# Patient Record
Sex: FEMALE | Race: WHITE | NOT HISPANIC OR LATINO
[De-identification: names, ages, dates, MRNs, and addresses within clinical notes are randomized per-mention and may not be internally consistent; named-entity substitution may affect disease eponyms.]

---

## 2021-10-15 ENCOUNTER — NON-APPOINTMENT (OUTPATIENT)
Age: 76
End: 2021-10-15

## 2021-10-15 ENCOUNTER — APPOINTMENT (OUTPATIENT)
Dept: HEART AND VASCULAR | Facility: CLINIC | Age: 76
End: 2021-10-15
Payer: MEDICARE

## 2021-10-15 VITALS
SYSTOLIC BLOOD PRESSURE: 130 MMHG | HEIGHT: 65 IN | WEIGHT: 181 LBS | HEART RATE: 51 BPM | BODY MASS INDEX: 30.16 KG/M2 | TEMPERATURE: 98.2 F | DIASTOLIC BLOOD PRESSURE: 78 MMHG

## 2021-10-15 DIAGNOSIS — R42 DIZZINESS AND GIDDINESS: ICD-10-CM

## 2021-10-15 DIAGNOSIS — Z23 ENCOUNTER FOR IMMUNIZATION: ICD-10-CM

## 2021-10-15 DIAGNOSIS — R00.2 PALPITATIONS: ICD-10-CM

## 2021-10-15 PROCEDURE — 93000 ELECTROCARDIOGRAM COMPLETE: CPT

## 2021-10-15 PROCEDURE — 93306 TTE W/DOPPLER COMPLETE: CPT

## 2021-10-15 PROCEDURE — 36415 COLL VENOUS BLD VENIPUNCTURE: CPT

## 2021-10-15 PROCEDURE — ZZZZZ: CPT

## 2021-10-15 PROCEDURE — G0008: CPT

## 2021-10-15 PROCEDURE — 99215 OFFICE O/P EST HI 40 MIN: CPT | Mod: 25

## 2021-10-15 PROCEDURE — 90662 IIV NO PRSV INCREASED AG IM: CPT

## 2021-10-16 NOTE — PHYSICAL EXAM
[Well Developed] : well developed [Well Nourished] : well nourished [No Acute Distress] : no acute distress [Normal Venous Pressure] : normal venous pressure [No Carotid Bruit] : no carotid bruit [Normal S1, S2] : normal S1, S2 [No Murmur] : no murmur [No Rub] : no rub [No Gallop] : no gallop [Clear Lung Fields] : clear lung fields [Good Air Entry] : good air entry [No Respiratory Distress] : no respiratory distress  [Soft] : abdomen soft [Non Tender] : non-tender [Normal Bowel Sounds] : normal bowel sounds [No Edema] : no edema [No Cyanosis] : no cyanosis [No Clubbing] : no clubbing [Normal Radial B/L] : normal radial B/L [Normal PT B/L] : normal PT B/L [Normal DP B/L] : normal DP B/L [Moves all extremities] : moves all extremities [No Focal Deficits] : no focal deficits [Normal memory] : normal memory [Alert and Oriented] : alert and oriented

## 2021-10-17 PROBLEM — R42 DIZZINESS: Status: ACTIVE | Noted: 2021-10-17

## 2021-10-17 PROBLEM — R00.2 PALPITATIONS: Status: ACTIVE | Noted: 2021-10-17

## 2021-10-17 LAB
ALBUMIN SERPL ELPH-MCNC: 4.6 G/DL
ALP BLD-CCNC: 116 U/L
ALT SERPL-CCNC: 11 U/L
ANION GAP SERPL CALC-SCNC: 15 MMOL/L
AST SERPL-CCNC: 16 U/L
BASOPHILS # BLD AUTO: 0.1 K/UL
BASOPHILS NFR BLD AUTO: 1.2 %
BILIRUB DIRECT SERPL-MCNC: 0.2 MG/DL
BILIRUB INDIRECT SERPL-MCNC: 0.4 MG/DL
BILIRUB SERPL-MCNC: 0.6 MG/DL
BUN SERPL-MCNC: 19 MG/DL
CALCIUM SERPL-MCNC: 9.5 MG/DL
CHLORIDE SERPL-SCNC: 102 MMOL/L
CHOLEST SERPL-MCNC: 182 MG/DL
CO2 SERPL-SCNC: 23 MMOL/L
CREAT SERPL-MCNC: 0.78 MG/DL
EOSINOPHIL # BLD AUTO: 0.17 K/UL
EOSINOPHIL NFR BLD AUTO: 2 %
ESTIMATED AVERAGE GLUCOSE: 114 MG/DL
GLUCOSE SERPL-MCNC: 92 MG/DL
HBA1C MFR BLD HPLC: 5.6 %
HCT VFR BLD CALC: 47.4 %
HDLC SERPL-MCNC: 74 MG/DL
HGB BLD-MCNC: 14.7 G/DL
IMM GRANULOCYTES NFR BLD AUTO: 0.3 %
LDLC SERPL CALC-MCNC: 80 MG/DL
LYMPHOCYTES # BLD AUTO: 2.73 K/UL
LYMPHOCYTES NFR BLD AUTO: 31.7 %
MAN DIFF?: NORMAL
MCHC RBC-ENTMCNC: 29.2 PG
MCHC RBC-ENTMCNC: 31 GM/DL
MCV RBC AUTO: 94 FL
MONOCYTES # BLD AUTO: 0.71 K/UL
MONOCYTES NFR BLD AUTO: 8.2 %
NEUTROPHILS # BLD AUTO: 4.88 K/UL
NEUTROPHILS NFR BLD AUTO: 56.6 %
NONHDLC SERPL-MCNC: 109 MG/DL
PLATELET # BLD AUTO: 322 K/UL
POTASSIUM SERPL-SCNC: 4.6 MMOL/L
PROT SERPL-MCNC: 7.4 G/DL
RBC # BLD: 5.04 M/UL
RBC # FLD: 13.6 %
SODIUM SERPL-SCNC: 140 MMOL/L
TRIGL SERPL-MCNC: 141 MG/DL
TSH SERPL-ACNC: 0.99 UIU/ML
WBC # FLD AUTO: 8.62 K/UL

## 2021-10-18 DIAGNOSIS — Z87.891 PERSONAL HISTORY OF NICOTINE DEPENDENCE: ICD-10-CM

## 2021-10-18 DIAGNOSIS — Z82.49 FAMILY HISTORY OF ISCHEMIC HEART DISEASE AND OTHER DISEASES OF THE CIRCULATORY SYSTEM: ICD-10-CM

## 2021-10-18 NOTE — DISCUSSION/SUMMARY
[FreeTextEntry1] : 76F with HTN, HLD, anxiety presenting to establish care. her BP is not well controlled on bystolic and since increasing her dose to 20mg she has experienced occasional dizziness. ECG today is sinus bradycardia, otherwise normal. cardiac exam unremarkable. she takes crestor for HLD and wonders if her cholesterol is at goal. her functional status is good, able to walk 5 blocks before experiencing leg pain. \par \par HTN: \par - wean off bystolic - 10mg for a week, 5mg the following week, then discontinue\par - start losartan 50mg daily and continue maintaining BP log\par - will obtain baseline echo\par  I have discussed the case with Dr. Miller, I have personally performed a history, physical exam, and my own medical decision making. I have reviewed the note and agree with the findings.  The patient sometimes has dyspnea when making her bed while on the phone.  She has left upper quadrant pain which last for seconds to a minute and has been occurring for years.  Last episode was a month ago.  She has palpitations twice a week which last for minutes.  She had dizziness for the last 2 days on the increased dose of Bystolic.  The EKG done for hypertension shows T wave inversions in V1 and V2.  The echocardiogram shows pulmonary hypertension.  The patient will undergo CT pulmonary angiogram and PFTs.  She will have additional echo views for an atrial septal defect.\par HLD:\par - c/w crestor 10mg daily\par - sending lipid panel,BMP\par \par

## 2021-10-18 NOTE — REASON FOR VISIT
[Symptom and Test Evaluation] : symptom and test evaluation [FreeTextEntry1] : Ms. Kiser is a 76 F with PMH HTN on bystolic, HLD on crestor 10mg, anxiety  with a PSH of hemicolectomy d/t tumors who presents to establish care. she was previously on amlodipine for HTN but stopped d/t LE edema. since then she has been on increasing doses of bystolic, now taking 20mg. she states that since increasing her dose she gets dizzy occasionally, never syncope. she measures her BP at home and brought a log of them to the visit. BP mostly in 150s-160s/80s-90s with some readings int he 130s. Her  passed away 9 months ago and she has been having a hard time with that, stating she hasn’t been as active and has gained weight. she denies exertional dyspnea or chest pain, exercise mostly limited by leg pain for which she takes tramadol prn. she sleeps on one pillow, no PND or Le swelling.

## 2021-10-29 ENCOUNTER — APPOINTMENT (OUTPATIENT)
Dept: HEART AND VASCULAR | Facility: CLINIC | Age: 76
End: 2021-10-29
Payer: MEDICARE

## 2021-10-29 ENCOUNTER — INPATIENT (INPATIENT)
Facility: HOSPITAL | Age: 76
LOS: 2 days | Discharge: HOME | DRG: 175 | End: 2021-11-01
Attending: STUDENT IN AN ORGANIZED HEALTH CARE EDUCATION/TRAINING PROGRAM | Admitting: STUDENT IN AN ORGANIZED HEALTH CARE EDUCATION/TRAINING PROGRAM
Payer: MEDICARE

## 2021-10-29 VITALS — OXYGEN SATURATION: 90 %

## 2021-10-29 VITALS
DIASTOLIC BLOOD PRESSURE: 87 MMHG | TEMPERATURE: 98 F | OXYGEN SATURATION: 100 % | SYSTOLIC BLOOD PRESSURE: 164 MMHG | RESPIRATION RATE: 16 BRPM | WEIGHT: 177.91 LBS | HEART RATE: 76 BPM

## 2021-10-29 VITALS
HEART RATE: 64 BPM | BODY MASS INDEX: 33.61 KG/M2 | SYSTOLIC BLOOD PRESSURE: 160 MMHG | WEIGHT: 178 LBS | HEIGHT: 61.02 IN | DIASTOLIC BLOOD PRESSURE: 78 MMHG | TEMPERATURE: 98.6 F

## 2021-10-29 VITALS — OXYGEN SATURATION: 93 %

## 2021-10-29 DIAGNOSIS — Z01.89 ENCOUNTER FOR OTHER SPECIFIED SPECIAL EXAMINATIONS: ICD-10-CM

## 2021-10-29 LAB
ALBUMIN SERPL ELPH-MCNC: 4.3 G/DL — SIGNIFICANT CHANGE UP (ref 3.3–5)
ALP SERPL-CCNC: 104 U/L — SIGNIFICANT CHANGE UP (ref 40–120)
ALT FLD-CCNC: 10 U/L — SIGNIFICANT CHANGE UP (ref 10–45)
ANION GAP SERPL CALC-SCNC: 11 MMOL/L — SIGNIFICANT CHANGE UP (ref 5–17)
APTT BLD: 29.5 SEC — SIGNIFICANT CHANGE UP (ref 27.5–35.5)
AST SERPL-CCNC: 15 U/L — SIGNIFICANT CHANGE UP (ref 10–40)
BASOPHILS # BLD AUTO: 0.06 K/UL — SIGNIFICANT CHANGE UP (ref 0–0.2)
BASOPHILS NFR BLD AUTO: 0.7 % — SIGNIFICANT CHANGE UP (ref 0–2)
BILIRUB SERPL-MCNC: 0.5 MG/DL — SIGNIFICANT CHANGE UP (ref 0.2–1.2)
BUN SERPL-MCNC: 14 MG/DL — SIGNIFICANT CHANGE UP (ref 7–23)
CALCIUM SERPL-MCNC: 9 MG/DL — SIGNIFICANT CHANGE UP (ref 8.4–10.5)
CHLORIDE SERPL-SCNC: 103 MMOL/L — SIGNIFICANT CHANGE UP (ref 96–108)
CO2 SERPL-SCNC: 24 MMOL/L — SIGNIFICANT CHANGE UP (ref 22–31)
CREAT SERPL-MCNC: 0.8 MG/DL — SIGNIFICANT CHANGE UP (ref 0.5–1.3)
EOSINOPHIL # BLD AUTO: 0.12 K/UL — SIGNIFICANT CHANGE UP (ref 0–0.5)
EOSINOPHIL NFR BLD AUTO: 1.4 % — SIGNIFICANT CHANGE UP (ref 0–6)
GLUCOSE SERPL-MCNC: 105 MG/DL — HIGH (ref 70–99)
HCT VFR BLD CALC: 44.1 % — SIGNIFICANT CHANGE UP (ref 34.5–45)
HGB BLD-MCNC: 14.6 G/DL — SIGNIFICANT CHANGE UP (ref 11.5–15.5)
IMM GRANULOCYTES NFR BLD AUTO: 0.3 % — SIGNIFICANT CHANGE UP (ref 0–1.5)
INR BLD: 1.11 — SIGNIFICANT CHANGE UP (ref 0.88–1.16)
LYMPHOCYTES # BLD AUTO: 2.22 K/UL — SIGNIFICANT CHANGE UP (ref 1–3.3)
LYMPHOCYTES # BLD AUTO: 25.2 % — SIGNIFICANT CHANGE UP (ref 13–44)
MCHC RBC-ENTMCNC: 29.2 PG — SIGNIFICANT CHANGE UP (ref 27–34)
MCHC RBC-ENTMCNC: 33.1 GM/DL — SIGNIFICANT CHANGE UP (ref 32–36)
MCV RBC AUTO: 88.2 FL — SIGNIFICANT CHANGE UP (ref 80–100)
MONOCYTES # BLD AUTO: 0.71 K/UL — SIGNIFICANT CHANGE UP (ref 0–0.9)
MONOCYTES NFR BLD AUTO: 8.1 % — SIGNIFICANT CHANGE UP (ref 2–14)
NEUTROPHILS # BLD AUTO: 5.66 K/UL — SIGNIFICANT CHANGE UP (ref 1.8–7.4)
NEUTROPHILS NFR BLD AUTO: 64.3 % — SIGNIFICANT CHANGE UP (ref 43–77)
NRBC # BLD: 0 /100 WBCS — SIGNIFICANT CHANGE UP (ref 0–0)
PLATELET # BLD AUTO: 325 K/UL — SIGNIFICANT CHANGE UP (ref 150–400)
POTASSIUM SERPL-MCNC: 3.8 MMOL/L — SIGNIFICANT CHANGE UP (ref 3.5–5.3)
POTASSIUM SERPL-SCNC: 3.8 MMOL/L — SIGNIFICANT CHANGE UP (ref 3.5–5.3)
PROT SERPL-MCNC: 7.3 G/DL — SIGNIFICANT CHANGE UP (ref 6–8.3)
PROTHROM AB SERPL-ACNC: 13.3 SEC — SIGNIFICANT CHANGE UP (ref 10.6–13.6)
RBC # BLD: 5 M/UL — SIGNIFICANT CHANGE UP (ref 3.8–5.2)
RBC # FLD: 13.2 % — SIGNIFICANT CHANGE UP (ref 10.3–14.5)
SARS-COV-2 RNA SPEC QL NAA+PROBE: NEGATIVE — SIGNIFICANT CHANGE UP
SODIUM SERPL-SCNC: 138 MMOL/L — SIGNIFICANT CHANGE UP (ref 135–145)
WBC # BLD: 8.8 K/UL — SIGNIFICANT CHANGE UP (ref 3.8–10.5)
WBC # FLD AUTO: 8.8 K/UL — SIGNIFICANT CHANGE UP (ref 3.8–10.5)

## 2021-10-29 PROCEDURE — 71275 CT ANGIOGRAPHY CHEST: CPT | Mod: 26,MC

## 2021-10-29 PROCEDURE — 94010 BREATHING CAPACITY TEST: CPT | Mod: 59

## 2021-10-29 PROCEDURE — 99215 OFFICE O/P EST HI 40 MIN: CPT | Mod: 25

## 2021-10-29 PROCEDURE — 99285 EMERGENCY DEPT VISIT HI MDM: CPT

## 2021-10-29 PROCEDURE — 94621 CARDIOPULM EXERCISE TESTING: CPT

## 2021-10-29 PROCEDURE — 94727 GAS DIL/WSHOT DETER LNG VOL: CPT

## 2021-10-29 PROCEDURE — ZZZZZ: CPT

## 2021-10-29 PROCEDURE — 93321 DOPPLER ECHO F-UP/LMTD STD: CPT

## 2021-10-29 PROCEDURE — 94729 DIFFUSING CAPACITY: CPT

## 2021-10-29 PROCEDURE — 99291 CRITICAL CARE FIRST HOUR: CPT

## 2021-10-29 PROCEDURE — 93308 TTE F-UP OR LMTD: CPT

## 2021-10-29 PROCEDURE — 93000 ELECTROCARDIOGRAM COMPLETE: CPT

## 2021-10-29 PROCEDURE — 93010 ELECTROCARDIOGRAM REPORT: CPT

## 2021-10-29 RX ORDER — HEPARIN SODIUM 5000 [USP'U]/ML
3000 INJECTION INTRAVENOUS; SUBCUTANEOUS EVERY 6 HOURS
Refills: 0 | Status: DISCONTINUED | OUTPATIENT
Start: 2021-10-29 | End: 2021-10-30

## 2021-10-29 RX ORDER — HEPARIN SODIUM 5000 [USP'U]/ML
INJECTION INTRAVENOUS; SUBCUTANEOUS
Qty: 25000 | Refills: 0 | Status: DISCONTINUED | OUTPATIENT
Start: 2021-10-29 | End: 2021-10-30

## 2021-10-29 RX ORDER — HEPARIN SODIUM 5000 [USP'U]/ML
6500 INJECTION INTRAVENOUS; SUBCUTANEOUS ONCE
Refills: 0 | Status: COMPLETED | OUTPATIENT
Start: 2021-10-29 | End: 2021-10-29

## 2021-10-29 RX ORDER — HEPARIN SODIUM 5000 [USP'U]/ML
6500 INJECTION INTRAVENOUS; SUBCUTANEOUS EVERY 6 HOURS
Refills: 0 | Status: DISCONTINUED | OUTPATIENT
Start: 2021-10-29 | End: 2021-10-30

## 2021-10-29 RX ADMIN — HEPARIN SODIUM 1500 UNIT(S)/HR: 5000 INJECTION INTRAVENOUS; SUBCUTANEOUS at 21:08

## 2021-10-29 RX ADMIN — HEPARIN SODIUM 6500 UNIT(S): 5000 INJECTION INTRAVENOUS; SUBCUTANEOUS at 21:10

## 2021-10-29 NOTE — ED ADULT NURSE NOTE - OBJECTIVE STATEMENT
Sent in by MD for r/o PE due to elevated pulmonary pressures.  C/o of SOB [id number] increasing after taking the flu shot. long hx of uncontrolled HTN. Pt unable to walk a block like before. pt denies any cough, fever or chest pain.

## 2021-10-29 NOTE — ED PROVIDER NOTE - CLINICAL SUMMARY MEDICAL DECISION MAKING FREE TEXT BOX
75 y/o F pt presents to ED with shortness of breath. Plan for labs, CTA PE study, and reassess. 77 y/o F pt presents to ED with shortness of breath. Plan for labs, CTA PE study, and reassess.  Pt with bilateral PE's and right heart strain. Heparin IV given.  Pulm contacted and case discussed. Will be in ED to evaluate pt.   ICU consult placed- pt admitted to tele.

## 2021-10-29 NOTE — PHYSICAL EXAM
[Well Developed] : well developed [Well Nourished] : well nourished [Normal Conjunctiva] : normal conjunctiva [No Carotid Bruit] : no carotid bruit [Normal S1, S2] : normal S1, S2 [No Murmur] : no murmur [Clear Lung Fields] : clear lung fields [Good Air Entry] : good air entry [No Respiratory Distress] : no respiratory distress  [No Edema] : no edema [Normal Radial B/L] : normal radial B/L [Normal PT B/L] : normal PT B/L [Normal DP B/L] : normal DP B/L [Moves all extremities] : moves all extremities [No Focal Deficits] : no focal deficits [Alert and Oriented] : alert and oriented

## 2021-10-29 NOTE — ED ADULT NURSE NOTE - NURSING NEURO ORIENTATION
Anemia, chronic disease Epistaxis Closed fracture of left side of maxilla, initial encounter Closed fracture of left side of maxilla, initial encounter Dementia without behavioral disturbance, unspecified dementia type Dementia without behavioral disturbance, unspecified dementia type Epistaxis Epistaxis Fall, initial encounter oriented to person, place and time

## 2021-10-29 NOTE — ED PROVIDER NOTE - OBJECTIVE STATEMENT
77 y/o F pt with PMHx of HTN presents to ED c/o persistent shortness of breath x 3 weeks. Pt was seen by PMD Dr. Baron earlier today with echo done in office, pt found to have elevated pulmonary pressure to 80, and was instructed to come to ED for PE study. In ED, with increased shortness of breath, dyspnea with ambulation, but pt denies numbness, tingling, fever, congestion, cold, cough, and chest pressure. Otherwise, pt is well appearing and speaking in full sentences.

## 2021-10-30 DIAGNOSIS — I10 ESSENTIAL (PRIMARY) HYPERTENSION: ICD-10-CM

## 2021-10-30 DIAGNOSIS — Z29.9 ENCOUNTER FOR PROPHYLACTIC MEASURES, UNSPECIFIED: ICD-10-CM

## 2021-10-30 DIAGNOSIS — I26.99 OTHER PULMONARY EMBOLISM WITHOUT ACUTE COR PULMONALE: ICD-10-CM

## 2021-10-30 DIAGNOSIS — Z74.09 OTHER REDUCED MOBILITY: ICD-10-CM

## 2021-10-30 DIAGNOSIS — R09.89 OTHER SPECIFIED SYMPTOMS AND SIGNS INVOLVING THE CIRCULATORY AND RESPIRATORY SYSTEMS: ICD-10-CM

## 2021-10-30 LAB
ALBUMIN SERPL ELPH-MCNC: 3.6 G/DL — SIGNIFICANT CHANGE UP (ref 3.3–5)
ALP SERPL-CCNC: 94 U/L — SIGNIFICANT CHANGE UP (ref 40–120)
ALT FLD-CCNC: 8 U/L — LOW (ref 10–45)
ANION GAP SERPL CALC-SCNC: 10 MMOL/L — SIGNIFICANT CHANGE UP (ref 5–17)
APTT BLD: 138.8 SEC — CRITICAL HIGH (ref 27.5–35.5)
APTT BLD: 161.3 SEC — CRITICAL HIGH (ref 27.5–35.5)
APTT BLD: 64 SEC — HIGH (ref 27.5–35.5)
APTT BLD: 65.3 SEC — HIGH (ref 27.5–35.5)
APTT BLD: >200 SEC — CRITICAL HIGH (ref 27.5–35.5)
AST SERPL-CCNC: 15 U/L — SIGNIFICANT CHANGE UP (ref 10–40)
BASOPHILS # BLD AUTO: 0.12 K/UL — SIGNIFICANT CHANGE UP (ref 0–0.2)
BASOPHILS NFR BLD AUTO: 1.6 % — SIGNIFICANT CHANGE UP (ref 0–2)
BILIRUB SERPL-MCNC: 0.7 MG/DL — SIGNIFICANT CHANGE UP (ref 0.2–1.2)
BUN SERPL-MCNC: 12 MG/DL — SIGNIFICANT CHANGE UP (ref 7–23)
CALCIUM SERPL-MCNC: 8.9 MG/DL — SIGNIFICANT CHANGE UP (ref 8.4–10.5)
CHLORIDE SERPL-SCNC: 105 MMOL/L — SIGNIFICANT CHANGE UP (ref 96–108)
CK MB CFR SERPL CALC: 2.2 NG/ML — SIGNIFICANT CHANGE UP (ref 0–6.7)
CK SERPL-CCNC: 63 U/L — SIGNIFICANT CHANGE UP (ref 25–170)
CO2 SERPL-SCNC: 24 MMOL/L — SIGNIFICANT CHANGE UP (ref 22–31)
CREAT SERPL-MCNC: 0.82 MG/DL — SIGNIFICANT CHANGE UP (ref 0.5–1.3)
D DIMER BLD IA.RAPID-MCNC: 1484 NG/ML DDU — HIGH
EOSINOPHIL # BLD AUTO: 0.38 K/UL — SIGNIFICANT CHANGE UP (ref 0–0.5)
EOSINOPHIL NFR BLD AUTO: 5 % — SIGNIFICANT CHANGE UP (ref 0–6)
GLUCOSE SERPL-MCNC: 99 MG/DL — SIGNIFICANT CHANGE UP (ref 70–99)
HCT VFR BLD CALC: 40.7 % — SIGNIFICANT CHANGE UP (ref 34.5–45)
HGB BLD-MCNC: 13.1 G/DL — SIGNIFICANT CHANGE UP (ref 11.5–15.5)
IMM GRANULOCYTES NFR BLD AUTO: 0.3 % — SIGNIFICANT CHANGE UP (ref 0–1.5)
LACTATE SERPL-SCNC: 0.8 MMOL/L — SIGNIFICANT CHANGE UP (ref 0.5–2)
LYMPHOCYTES # BLD AUTO: 2.49 K/UL — SIGNIFICANT CHANGE UP (ref 1–3.3)
LYMPHOCYTES # BLD AUTO: 33.1 % — SIGNIFICANT CHANGE UP (ref 13–44)
MAGNESIUM SERPL-MCNC: 1.9 MG/DL — SIGNIFICANT CHANGE UP (ref 1.6–2.6)
MCHC RBC-ENTMCNC: 28.4 PG — SIGNIFICANT CHANGE UP (ref 27–34)
MCHC RBC-ENTMCNC: 32.2 GM/DL — SIGNIFICANT CHANGE UP (ref 32–36)
MCV RBC AUTO: 88.3 FL — SIGNIFICANT CHANGE UP (ref 80–100)
MONOCYTES # BLD AUTO: 0.7 K/UL — SIGNIFICANT CHANGE UP (ref 0–0.9)
MONOCYTES NFR BLD AUTO: 9.3 % — SIGNIFICANT CHANGE UP (ref 2–14)
NEUTROPHILS # BLD AUTO: 3.82 K/UL — SIGNIFICANT CHANGE UP (ref 1.8–7.4)
NEUTROPHILS NFR BLD AUTO: 50.7 % — SIGNIFICANT CHANGE UP (ref 43–77)
NRBC # BLD: 0 /100 WBCS — SIGNIFICANT CHANGE UP (ref 0–0)
NT-PROBNP SERPL-SCNC: 396 PG/ML — HIGH (ref 0–300)
PHOSPHATE SERPL-MCNC: 4 MG/DL — SIGNIFICANT CHANGE UP (ref 2.5–4.5)
PLATELET # BLD AUTO: 300 K/UL — SIGNIFICANT CHANGE UP (ref 150–400)
POTASSIUM SERPL-MCNC: 4.2 MMOL/L — SIGNIFICANT CHANGE UP (ref 3.5–5.3)
POTASSIUM SERPL-SCNC: 4.2 MMOL/L — SIGNIFICANT CHANGE UP (ref 3.5–5.3)
PROT SERPL-MCNC: 6.5 G/DL — SIGNIFICANT CHANGE UP (ref 6–8.3)
RBC # BLD: 4.61 M/UL — SIGNIFICANT CHANGE UP (ref 3.8–5.2)
RBC # FLD: 13.1 % — SIGNIFICANT CHANGE UP (ref 10.3–14.5)
SODIUM SERPL-SCNC: 139 MMOL/L — SIGNIFICANT CHANGE UP (ref 135–145)
TROPONIN T SERPL-MCNC: 0.01 NG/ML — SIGNIFICANT CHANGE UP (ref 0–0.01)
WBC # BLD: 7.53 K/UL — SIGNIFICANT CHANGE UP (ref 3.8–10.5)
WBC # FLD AUTO: 7.53 K/UL — SIGNIFICANT CHANGE UP (ref 3.8–10.5)

## 2021-10-30 PROCEDURE — 93970 EXTREMITY STUDY: CPT | Mod: 26

## 2021-10-30 PROCEDURE — 99233 SBSQ HOSP IP/OBS HIGH 50: CPT | Mod: GC

## 2021-10-30 RX ORDER — TRAMADOL HYDROCHLORIDE 50 MG/1
25 TABLET ORAL
Qty: 0 | Refills: 0 | DISCHARGE

## 2021-10-30 RX ORDER — ATORVASTATIN CALCIUM 80 MG/1
40 TABLET, FILM COATED ORAL AT BEDTIME
Refills: 0 | Status: DISCONTINUED | OUTPATIENT
Start: 2021-10-30 | End: 2021-11-01

## 2021-10-30 RX ORDER — ROSUVASTATIN CALCIUM 5 MG/1
1 TABLET ORAL
Qty: 0 | Refills: 0 | DISCHARGE

## 2021-10-30 RX ORDER — TRAMADOL HYDROCHLORIDE 50 MG/1
25 TABLET ORAL EVERY 6 HOURS
Refills: 0 | Status: DISCONTINUED | OUTPATIENT
Start: 2021-10-30 | End: 2021-11-01

## 2021-10-30 RX ORDER — NEBIVOLOL HYDROCHLORIDE 5 MG/1
1 TABLET ORAL
Qty: 0 | Refills: 0 | DISCHARGE

## 2021-10-30 RX ORDER — HEPARIN SODIUM 5000 [USP'U]/ML
1200 INJECTION INTRAVENOUS; SUBCUTANEOUS
Qty: 25000 | Refills: 0 | Status: DISCONTINUED | OUTPATIENT
Start: 2021-10-30 | End: 2021-10-30

## 2021-10-30 RX ORDER — HEPARIN SODIUM 5000 [USP'U]/ML
900 INJECTION INTRAVENOUS; SUBCUTANEOUS
Qty: 25000 | Refills: 0 | Status: DISCONTINUED | OUTPATIENT
Start: 2021-10-30 | End: 2021-10-31

## 2021-10-30 RX ORDER — NEBIVOLOL HYDROCHLORIDE 5 MG/1
10 TABLET ORAL DAILY
Refills: 0 | Status: DISCONTINUED | OUTPATIENT
Start: 2021-10-30 | End: 2021-11-01

## 2021-10-30 RX ADMIN — HEPARIN SODIUM 9 UNIT(S)/HR: 5000 INJECTION INTRAVENOUS; SUBCUTANEOUS at 14:33

## 2021-10-30 RX ADMIN — NEBIVOLOL HYDROCHLORIDE 10 MILLIGRAM(S): 5 TABLET ORAL at 19:45

## 2021-10-30 RX ADMIN — HEPARIN SODIUM 12 UNIT(S)/HR: 5000 INJECTION INTRAVENOUS; SUBCUTANEOUS at 04:15

## 2021-10-30 NOTE — CONSULT NOTE ADULT - ASSESSMENT
75 y/o F pt with PMHx of HTN presents to ED c/o persistent shortness of breath x 3 week, found to have PE with evidence of RHS on CT scan   ICU consulted for- submassive PE     #Pulmonary embolism  Provoked in setting of recent immobilization  -Start heparin drip (s/p bolus)  -Monitor PTT q6 hourly- goal is 58-99  -Monitor H/H  -Daily lactate, pro-BNP, troponin, D Dimer  -F/u AM Echo  -PERT team f/u   -Hold all antihypertensives   -Consider LE dopplers    Plan of care discussed with Dr. Linares    Dispo: [facility]

## 2021-10-30 NOTE — CONSULT NOTE ADULT - ATTENDING COMMENTS
77 y/o F pt with PMHx of HTN presents to ED c/o persistent shortness of breath x 3 weeks. The patient was found to have multiple swegmental and sub segmental PEs in both lobes along with Rv strain. She was also noted to have elevated BNP.   Patient is being admitted to step down for a submassive PE.    - Continue heparin drip  - Obtain ECHO and LE doppler b/l  - trnd BNP and troponin

## 2021-10-30 NOTE — H&P ADULT - NSHPREVIEWOFSYSTEMS_GEN_ALL_CORE
CONSTITUTIONAL: No weakness, fevers or chills  EYES/ENT: No visual changes;  No vertigo or throat pain   NECK: No pain or stiffness  RESPIRATORY: No cough, wheezing, hemoptysis; No shortness of breath  CARDIOVASCULAR: No chest pain or palpitations  GASTROINTESTINAL: No abdominal or epigastric pain. No nausea, vomiting, or hematemesis; No diarrhea or constipation. No melena or hematochezia.  GENITOURINARY: No dysuria, frequency or hematuria  NEUROLOGICAL: No numbness or weakness  SKIN: No itching, rashes CONSTITUTIONAL: No weakness, fevers or chills  EYES/ENT: No visual changes;  No vertigo or throat pain   NECK: No pain or stiffness  RESPIRATORY: SOB. No cough, wheezing, hemoptysis.  CARDIOVASCULAR: No chest pain or palpitations  GASTROINTESTINAL: No abdominal or epigastric pain. No nausea, vomiting, or hematemesis; No diarrhea or constipation. No melena or hematochezia.  GENITOURINARY: No dysuria, frequency or hematuria  NEUROLOGICAL: No numbness or weakness  SKIN: No itching, rashes

## 2021-10-30 NOTE — H&P ADULT - PROBLEM SELECTOR PLAN 2
Pt with h/o HTN, on bystolic (nebivolol) 10mg daily at home.    Plan:  -HOLDING bystolic i/s/o PE Pt with h/o HTN, on bystolic (nebivolol) 10mg daily at home. Was also told to start losartan but never started it.    Plan:  -HOLDING bystolic i/s/o PE

## 2021-10-30 NOTE — H&P ADULT - PROBLEM SELECTOR PLAN 1
Pt admitted with Pt admitted with SOB, found to have submassive PE on CT PE (Segmental and subsegmental pulmonary emboli involving all lobes with evidence of right heart strain).  Likely provoked by recent immobility  Pro-BNP elevated  Currently on 2 L NC    Plan:  -c/w heparin gtt, adjust per nomogram  -f/u troponin, CKMB, CK, repeat pro-BNP  -f/u TTE  -f/u LE dopplers  -wean O2 as tolerated Pt admitted with SOB, found to have submassive PE on CT PE (Segmental and subsegmental pulmonary emboli involving all lobes with evidence of right heart strain).  Likely provoked by recent immobility  Pro-BNP elevated  Currently on 2 L NC    Plan:  -c/w heparin gtt, adjust per nomogram  -f/u troponin, CKMB, CK, D-dimer, repeat pro-BNP  -f/u TTE  -f/u LE dopplers  -wean O2 as tolerated

## 2021-10-30 NOTE — H&P ADULT - HISTORY OF PRESENT ILLNESS
***INCOMPLETE****    75 y/o F pt with PMHx of HTN presents to ED c/o persistent shortness of breath x 3 weeks. Pt was seen by PMD Dr. Baron earlier today with echo done in office, pt found to have elevated pulmonary pressure to 80, and was instructed to come to ED for PE study. In ED, with increased shortness of breath, dyspnea with ambulation, but pt denies numbness, tingling, fever, congestion, cold, cough, and chest pressure. Otherwise, pt is well appearing and speaking in full sentences.    bedbound since  . Shortness of breath for past several weeks, now can't walk half a block. Cardiologist did echo, saw pulm HTN.     ED Course:   ***INCOMPLETE****  75 y/o F pt with PMHx of HTN presents to ED c/o persistent shortness of breath x 3 weeks. She reports it has progressively worsened such that she would have to stop after a half block. Pt was seen by PMD Dr. Baron earlier on day of admission with echo done in office, pt found to have elevated pulmonary pressure to 80, and was instructed to come to ED for PE study. In ED, with increased shortness of breath. Denies fever, recent URI, cough, chest pain. Of note, pt recently lost her , and has been essentially bedbound since then.     ED Course:  Vitals:  ***INCOMPLETE****  77 y/o F pt with PMHx of HTN presents to ED c/o persistent shortness of breath x 3 weeks. She reports it has progressively worsened such that she would have to stop after a half block. Pt was seen by PMD Dr. Baron earlier on day of admission with echo done in office, pt found to have elevated pulmonary pressure to 80, and was instructed to come to ED for PE study. In ED, with increased shortness of breath. Denies fever, recent URI, cough, chest pain. Of note, pt recently lost her , and has been essentially bedbound since then.     ED Course:  Vitals: 97.6, HR  77 y/o F pt with PMHx of HTN presents to ED c/o persistent shortness of breath x 3 weeks. Patient stated that for the last month she has been experiencing BEARDEN when walking a few blocks, but in the last week has progressed to SOB when moving around in bed. It's also associated with chest pressure/heaviness. Pt was seen by PMD Dr. Baron earlier on day of admission and an echo was done in office and pt found to have elevated pulmonary pressure to 80, and was instructed to come to ED for PE study. Denies palpitations, LE swelling/pain, hemoptysis, syncope, fever, recent URI, cough, chest pain. Of note, pt recently lost her , and has been essentially bedbound since then. No history of malignancy and pt is up to date on cancer screenings.    ED Course:  Vitals: T 97.6  HR 68  /60  SpO2 saturating 100% on 3 L NC   Imaging/EKG: CTPE: Segmental and subsegmental pulmonary emboli involving all lobes. Enlarged pulmonary trunk and increased ratio of right ventricular to left ventricular size suggestive of right heart strain.  Interventions: heparin 6500 IVP and infusion  Significant labs: pro-

## 2021-10-30 NOTE — H&P ADULT - ASSESSMENT
76 year old F pt with PMHx of HTN presents to ED c/o persistent shortness of breath x 3 weeks. 76 year old F pt with PMHx of HTN presented with shortness of breath x 3 week, admitted for PE.

## 2021-10-30 NOTE — H&P ADULT - PROBLEM SELECTOR PLAN 3
Pt with recent immobility, possibly 2/2 to grief from recent loss of     Plan:  -f/u PT recs  -consider psych consult

## 2021-10-30 NOTE — PROGRESS NOTE ADULT - SUBJECTIVE AND OBJECTIVE BOX
**incomplete** TRANSFER [facility] to [facility]     Hospital Course:   76 year old F pt with PMHx of HTN presented with shortness of breath x 3 week, found to have provoked b/l PE, LE dopplers + b/l DVT. Pt admitted with SOB, found to have submassive PE on CT PE (Segmental and subsegmental pulmonary emboli involving all lobes with evidence of right heart strain). Likely provoked by recent immobility. Pro-BNP elevated. Trop peaked at 0.01. Started on heparin ggt. On 2LNC. LE dopplers + for b/l DVt in peroneal veins. Patient weaned to RA. Stable for transfer to [facility] for PT consult in setting of recent immobilization  and transition to PO medications.    SUBJECTIVE/OVERNIGHT EVENTS: No acute overnight events. Pt seen in AM at bedside, resting comfortably in bed, and does not appear to be in any acute distress. Tolerating RA. When asked, pt denies any recent or active fever, chills, nausea, vomiting, headache, acute sob, chest pain, abdominal pain, genitourinary sx, extremity pain or swelling.    VITAL SIGNS:  Vital Signs Last 24 Hrs  T(C): 36.3 (30 Oct 2021 17:56), Max: 36.9 (30 Oct 2021 10:10)  T(F): 97.4 (30 Oct 2021 17:56), Max: 98.5 (30 Oct 2021 10:10)  HR: 73 (30 Oct 2021 16:02) (60 - 74)  BP: 124/60 (30 Oct 2021 16:02) (124/60 - 173/72)  BP(mean): 87 (30 Oct 2021 16:02) (87 - 104)  RR: 19 (30 Oct 2021 16:02) (16 - 20)  SpO2: 98% (30 Oct 2021 16:02) (97% - 100%)    PHYSICAL EXAM:  General: NAD; speaking in full sentences  HEENT: NC/AT; PERRL; EOMI; MMM  Neck: supple; no JVD  Cardiac: RRR; +S1/S2  Pulm: CTA B/L; no W/R/R, weaned from 4L to RA  GI: soft, NT/ND, +BS  Extremities: WWP; no edema, clubbing or cyanosis  Vasc: 2+ radial, DP pulses B/L  Neuro: AAOx3; no focal deficits    MEDICATIONS:  MEDICATIONS  (STANDING):  atorvastatin 40 milliGRAM(s) Oral at bedtime  heparin  Infusion 900 Unit(s)/Hr (9 mL/Hr) IV Continuous <Continuous>    MEDICATIONS  (PRN):  traMADol 25 milliGRAM(s) Oral every 6 hours PRN Mild Pain (1 - 3)      ALLERGIES:  Allergies    amlodipine (Unknown)    Intolerances        LABS:                        13.1   7.53  )-----------( 300      ( 30 Oct 2021 06:43 )             40.7     10-30    139  |  105  |  12  ----------------------------<  99  4.2   |  24  |  0.82    Ca    8.9      30 Oct 2021 06:43  Phos  4.0     10-30  Mg     1.9     10-30    TPro  6.5  /  Alb  3.6  /  TBili  0.7  /  DBili  x   /  AST  15  /  ALT  8<L>  /  AlkPhos  94  10-30    PT/INR - ( 29 Oct 2021 18:03 )   PT: 13.3 sec;   INR: 1.11          PTT - ( 30 Oct 2021 11:29 )  PTT:138.8 sec    RADIOLOGY & ADDITIONAL TESTS:     CTA PE: Segmental and subsegmental pulmonary emboli involving all lobes. Enlarged pulmonary trunk and increased ratio of right ventricular to left ventricular size suggestive of right heart strain. No pulmonary infarction.

## 2021-10-30 NOTE — PROGRESS NOTE ADULT - PROBLEM SELECTOR PLAN 1
Pt admitted with SOB, found to have submassive PE on CT PE (Segmental and subsegmental pulmonary emboli involving all lobes with evidence of right heart strain). Likely provoked by recent immobility. Pro-BNP elevated. Currently on 2 L NC. Trop 0.01. LE dopplers + for b/l DVt in peroneal veins. On RA.   Plan:  - c/w heparin gtt, adjust per nomogram  - d/c on DOAC   - f/u TTE

## 2021-10-31 LAB
ALBUMIN SERPL ELPH-MCNC: 4 G/DL — SIGNIFICANT CHANGE UP (ref 3.3–5)
ALP SERPL-CCNC: 101 U/L — SIGNIFICANT CHANGE UP (ref 40–120)
ALT FLD-CCNC: 9 U/L — LOW (ref 10–45)
ANION GAP SERPL CALC-SCNC: 13 MMOL/L — SIGNIFICANT CHANGE UP (ref 5–17)
APTT BLD: 64.6 SEC — HIGH (ref 27.5–35.5)
AST SERPL-CCNC: 18 U/L — SIGNIFICANT CHANGE UP (ref 10–40)
BASOPHILS # BLD AUTO: 0.11 K/UL — SIGNIFICANT CHANGE UP (ref 0–0.2)
BASOPHILS NFR BLD AUTO: 1.6 % — SIGNIFICANT CHANGE UP (ref 0–2)
BILIRUB SERPL-MCNC: 0.6 MG/DL — SIGNIFICANT CHANGE UP (ref 0.2–1.2)
BUN SERPL-MCNC: 15 MG/DL — SIGNIFICANT CHANGE UP (ref 7–23)
CALCIUM SERPL-MCNC: 9.4 MG/DL — SIGNIFICANT CHANGE UP (ref 8.4–10.5)
CHLORIDE SERPL-SCNC: 105 MMOL/L — SIGNIFICANT CHANGE UP (ref 96–108)
CO2 SERPL-SCNC: 21 MMOL/L — LOW (ref 22–31)
CREAT SERPL-MCNC: 0.89 MG/DL — SIGNIFICANT CHANGE UP (ref 0.5–1.3)
D DIMER BLD IA.RAPID-MCNC: 810 NG/ML DDU — HIGH
EOSINOPHIL # BLD AUTO: 0.42 K/UL — SIGNIFICANT CHANGE UP (ref 0–0.5)
EOSINOPHIL NFR BLD AUTO: 5.9 % — SIGNIFICANT CHANGE UP (ref 0–6)
GLUCOSE SERPL-MCNC: 102 MG/DL — HIGH (ref 70–99)
HCT VFR BLD CALC: 43.7 % — SIGNIFICANT CHANGE UP (ref 34.5–45)
HGB BLD-MCNC: 13.9 G/DL — SIGNIFICANT CHANGE UP (ref 11.5–15.5)
IMM GRANULOCYTES NFR BLD AUTO: 0.3 % — SIGNIFICANT CHANGE UP (ref 0–1.5)
LYMPHOCYTES # BLD AUTO: 3.11 K/UL — SIGNIFICANT CHANGE UP (ref 1–3.3)
LYMPHOCYTES # BLD AUTO: 43.9 % — SIGNIFICANT CHANGE UP (ref 13–44)
MAGNESIUM SERPL-MCNC: 2.1 MG/DL — SIGNIFICANT CHANGE UP (ref 1.6–2.6)
MCHC RBC-ENTMCNC: 28.4 PG — SIGNIFICANT CHANGE UP (ref 27–34)
MCHC RBC-ENTMCNC: 31.8 GM/DL — LOW (ref 32–36)
MCV RBC AUTO: 89.2 FL — SIGNIFICANT CHANGE UP (ref 80–100)
MONOCYTES # BLD AUTO: 0.55 K/UL — SIGNIFICANT CHANGE UP (ref 0–0.9)
MONOCYTES NFR BLD AUTO: 7.8 % — SIGNIFICANT CHANGE UP (ref 2–14)
NEUTROPHILS # BLD AUTO: 2.87 K/UL — SIGNIFICANT CHANGE UP (ref 1.8–7.4)
NEUTROPHILS NFR BLD AUTO: 40.5 % — LOW (ref 43–77)
NRBC # BLD: 0 /100 WBCS — SIGNIFICANT CHANGE UP (ref 0–0)
NT-PROBNP SERPL-SCNC: 333 PG/ML — HIGH (ref 0–300)
PHOSPHATE SERPL-MCNC: 3.6 MG/DL — SIGNIFICANT CHANGE UP (ref 2.5–4.5)
PLATELET # BLD AUTO: 372 K/UL — SIGNIFICANT CHANGE UP (ref 150–400)
POTASSIUM SERPL-MCNC: 4 MMOL/L — SIGNIFICANT CHANGE UP (ref 3.5–5.3)
POTASSIUM SERPL-SCNC: 4 MMOL/L — SIGNIFICANT CHANGE UP (ref 3.5–5.3)
PROT SERPL-MCNC: 7.2 G/DL — SIGNIFICANT CHANGE UP (ref 6–8.3)
RBC # BLD: 4.9 M/UL — SIGNIFICANT CHANGE UP (ref 3.8–5.2)
RBC # FLD: 13.1 % — SIGNIFICANT CHANGE UP (ref 10.3–14.5)
SODIUM SERPL-SCNC: 139 MMOL/L — SIGNIFICANT CHANGE UP (ref 135–145)
TROPONIN T SERPL-MCNC: 0.01 NG/ML — SIGNIFICANT CHANGE UP (ref 0–0.01)
WBC # BLD: 7.08 K/UL — SIGNIFICANT CHANGE UP (ref 3.8–10.5)
WBC # FLD AUTO: 7.08 K/UL — SIGNIFICANT CHANGE UP (ref 3.8–10.5)

## 2021-10-31 PROCEDURE — 93306 TTE W/DOPPLER COMPLETE: CPT | Mod: 26

## 2021-10-31 PROCEDURE — 99233 SBSQ HOSP IP/OBS HIGH 50: CPT | Mod: GC

## 2021-10-31 RX ORDER — APIXABAN 2.5 MG/1
10 TABLET, FILM COATED ORAL EVERY 12 HOURS
Refills: 0 | Status: DISCONTINUED | OUTPATIENT
Start: 2021-10-31 | End: 2021-11-01

## 2021-10-31 RX ADMIN — APIXABAN 10 MILLIGRAM(S): 2.5 TABLET, FILM COATED ORAL at 14:51

## 2021-10-31 NOTE — PROGRESS NOTE ADULT - ASSESSMENT
76 year old F pt with PMHx of HTN presented with shortness of breath x 3 week, found to have provoked b/l PE, LE dopplers + b/l DVT, on heparin ggt. Pending transition to PO meds and PT consult in light of recent immobilization. 
76 year old F pt with PMHx of HTN presented with shortness of breath x 3 week, found to have provoked b/l PE, LE dopplers + b/l DVT, on heparin ggt. Pending transition to PO meds and PT consult in light of recent immobilization.

## 2021-10-31 NOTE — PROGRESS NOTE ADULT - PROBLEM SELECTOR PLAN 2
Pt with h/o HTN, on bystolic (nebivolol) 10mg daily at home. Was also told to start losartan but never started it.  -c/w home dose of bystolic 10 mg qdaily
Pt with h/o HTN, on bystolic (nebivolol) 10mg daily at home. Was also told to start losartan but never started it.  -c/w home dose of bystolic 10 mg qdaily

## 2021-10-31 NOTE — PROGRESS NOTE ADULT - PROBLEM SELECTOR PLAN 4
F: None  E: Replete PRN  N: regular diet   DVT: Heparin gtt
F: None  E: Replete PRN  N: regular diet   DVT: Heparin gtt

## 2021-10-31 NOTE — PROGRESS NOTE ADULT - SUBJECTIVE AND OBJECTIVE BOX
HOSPITAL COURSE:  77 y/o F pt with PMHx of HTN presents to ED c/o persistent shortness of breath x 3 weeks. Patient stated that for the last month she has been experiencing BEARDEN when walking a few blocks, but in the last week has progressed to SOB when moving around in bed. It's also associated with chest pressure/heaviness. Pt was seen by PMD Dr. Baron earlier on day of admission and an echo was done in office and pt found to have elevated pulmonary pressure to 80, and was instructed to come to ED for PE study.  Of note, pt recently lost her , and has been essentially bedbound since then. No history of malignancy and pt is up to date on cancer screenings. BNP was elevated on admission.  She currently is awaiting a TTE to evaluate for heart strain and will need to be switched to a DOAC for long term anticoagulation for tx of submassive PE. Patient deemed stable for RMF.     O/N Events: NAEO    Subjective/ROS: Patient seen and examined at bedside.     Denies Fever/Chills, HA, CP, SOB, n/v, changes in bowel/urinary habits.  12pt ROS otherwise negative.    VITALS  Vital Signs Last 24 Hrs  T(C): 36.4 (31 Oct 2021 10:54), Max: 36.7 (30 Oct 2021 21:15)  T(F): 97.5 (31 Oct 2021 10:54), Max: 98 (30 Oct 2021 21:15)  HR: 82 (31 Oct 2021 08:10) (70 - 82)  BP: 144/67 (31 Oct 2021 08:10) (124/60 - 144/67)  BP(mean): 96 (31 Oct 2021 08:10) (87 - 96)  RR: 17 (31 Oct 2021 08:10) (16 - 19)  SpO2: 93% (31 Oct 2021 08:10) (91% - 98%)    CAPILLARY BLOOD GLUCOSE          PHYSICAL EXAM  General: NAD  Head: NC/AT; MMM; PERRL; EOMI;  Neck: Supple; no JVD  Respiratory: CTAB; no wheezes/rales/rhonchi  Cardiovascular: Regular rhythm/rate; S1/S2+, no murmurs, rubs gallops   Gastrointestinal: Soft; NTND; bowel sounds normal and present  Extremities: WWP; no edema/cyanosis  Neurological: A&Ox3, CNII-XII grossly intact; no obvious focal deficits    MEDICATIONS  (STANDING):  atorvastatin 40 milliGRAM(s) Oral at bedtime  heparin  Infusion 900 Unit(s)/Hr (9 mL/Hr) IV Continuous <Continuous>  nebivolol 10 milliGRAM(s) Oral daily    MEDICATIONS  (PRN):  traMADol 25 milliGRAM(s) Oral every 6 hours PRN Mild Pain (1 - 3)      amlodipine (Unknown)      LABS                        13.9   7.08  )-----------( 372      ( 31 Oct 2021 07:09 )             43.7     10-31    139  |  105  |  15  ----------------------------<  102<H>  4.0   |  21<L>  |  0.89    Ca    9.4      31 Oct 2021 07:09  Phos  3.6     10-31  Mg     2.1     10-31    TPro  7.2  /  Alb  4.0  /  TBili  0.6  /  DBili  x   /  AST  18  /  ALT  9<L>  /  AlkPhos  101  10-31    PT/INR - ( 29 Oct 2021 18:03 )   PT: 13.3 sec;   INR: 1.11          PTT - ( 31 Oct 2021 07:09 )  PTT:64.6 sec    CARDIAC MARKERS ( 31 Oct 2021 07:09 )  x     / 0.01 ng/mL / x     / x     / x      CARDIAC MARKERS ( 30 Oct 2021 06:43 )  x     / 0.01 ng/mL / 63 U/L / x     / 2.2 ng/mL  CARDIAC MARKERS ( 29 Oct 2021 18:03 )  x     / 0.01 ng/mL / x     / x     / x              IMAGING/EKG/ETC

## 2021-10-31 NOTE — PROGRESS NOTE ADULT - PROBLEM SELECTOR PLAN 3
Pt with recent immobility, possibly 2/2 to grief from recent loss of   - f/u PT recs  - consider psych consult
Pt with recent immobility, possibly 2/2 to grief from recent loss of   - f/u PT recs  - consider psych consult

## 2021-11-01 ENCOUNTER — TRANSCRIPTION ENCOUNTER (OUTPATIENT)
Age: 76
End: 2021-11-01

## 2021-11-01 VITALS — TEMPERATURE: 98 F

## 2021-11-01 PROBLEM — Z01.89 LABORATORY TEST: Status: RESOLVED | Noted: 2021-10-17 | Resolved: 2021-11-01

## 2021-11-01 LAB
ALBUMIN SERPL ELPH-MCNC: 3.5 G/DL — SIGNIFICANT CHANGE UP (ref 3.3–5)
ALP SERPL-CCNC: 84 U/L — SIGNIFICANT CHANGE UP (ref 40–120)
ALT FLD-CCNC: 9 U/L — LOW (ref 10–45)
ANION GAP SERPL CALC-SCNC: 11 MMOL/L — SIGNIFICANT CHANGE UP (ref 5–17)
AST SERPL-CCNC: 16 U/L — SIGNIFICANT CHANGE UP (ref 10–40)
BASOPHILS # BLD AUTO: 0.07 K/UL — SIGNIFICANT CHANGE UP (ref 0–0.2)
BASOPHILS NFR BLD AUTO: 1.3 % — SIGNIFICANT CHANGE UP (ref 0–2)
BILIRUB SERPL-MCNC: 0.4 MG/DL — SIGNIFICANT CHANGE UP (ref 0.2–1.2)
BUN SERPL-MCNC: 16 MG/DL — SIGNIFICANT CHANGE UP (ref 7–23)
CALCIUM SERPL-MCNC: 9 MG/DL — SIGNIFICANT CHANGE UP (ref 8.4–10.5)
CHLORIDE SERPL-SCNC: 103 MMOL/L — SIGNIFICANT CHANGE UP (ref 96–108)
CO2 SERPL-SCNC: 23 MMOL/L — SIGNIFICANT CHANGE UP (ref 22–31)
CREAT SERPL-MCNC: 0.81 MG/DL — SIGNIFICANT CHANGE UP (ref 0.5–1.3)
EOSINOPHIL # BLD AUTO: 0.27 K/UL — SIGNIFICANT CHANGE UP (ref 0–0.5)
EOSINOPHIL NFR BLD AUTO: 4.9 % — SIGNIFICANT CHANGE UP (ref 0–6)
GLUCOSE SERPL-MCNC: 94 MG/DL — SIGNIFICANT CHANGE UP (ref 70–99)
HCT VFR BLD CALC: 38.7 % — SIGNIFICANT CHANGE UP (ref 34.5–45)
HGB BLD-MCNC: 12.7 G/DL — SIGNIFICANT CHANGE UP (ref 11.5–15.5)
IMM GRANULOCYTES NFR BLD AUTO: 0.2 % — SIGNIFICANT CHANGE UP (ref 0–1.5)
LYMPHOCYTES # BLD AUTO: 1.8 K/UL — SIGNIFICANT CHANGE UP (ref 1–3.3)
LYMPHOCYTES # BLD AUTO: 32.6 % — SIGNIFICANT CHANGE UP (ref 13–44)
MAGNESIUM SERPL-MCNC: 1.8 MG/DL — SIGNIFICANT CHANGE UP (ref 1.6–2.6)
MCHC RBC-ENTMCNC: 29.2 PG — SIGNIFICANT CHANGE UP (ref 27–34)
MCHC RBC-ENTMCNC: 32.8 GM/DL — SIGNIFICANT CHANGE UP (ref 32–36)
MCV RBC AUTO: 89 FL — SIGNIFICANT CHANGE UP (ref 80–100)
MONOCYTES # BLD AUTO: 0.55 K/UL — SIGNIFICANT CHANGE UP (ref 0–0.9)
MONOCYTES NFR BLD AUTO: 10 % — SIGNIFICANT CHANGE UP (ref 2–14)
NEUTROPHILS # BLD AUTO: 2.82 K/UL — SIGNIFICANT CHANGE UP (ref 1.8–7.4)
NEUTROPHILS NFR BLD AUTO: 51 % — SIGNIFICANT CHANGE UP (ref 43–77)
NRBC # BLD: 0 /100 WBCS — SIGNIFICANT CHANGE UP (ref 0–0)
PHOSPHATE SERPL-MCNC: 3.3 MG/DL — SIGNIFICANT CHANGE UP (ref 2.5–4.5)
PLATELET # BLD AUTO: 314 K/UL — SIGNIFICANT CHANGE UP (ref 150–400)
POTASSIUM SERPL-MCNC: 4.2 MMOL/L — SIGNIFICANT CHANGE UP (ref 3.5–5.3)
POTASSIUM SERPL-SCNC: 4.2 MMOL/L — SIGNIFICANT CHANGE UP (ref 3.5–5.3)
PROT SERPL-MCNC: 6.3 G/DL — SIGNIFICANT CHANGE UP (ref 6–8.3)
RBC # BLD: 4.35 M/UL — SIGNIFICANT CHANGE UP (ref 3.8–5.2)
RBC # FLD: 13.1 % — SIGNIFICANT CHANGE UP (ref 10.3–14.5)
SODIUM SERPL-SCNC: 137 MMOL/L — SIGNIFICANT CHANGE UP (ref 135–145)
WBC # BLD: 5.52 K/UL — SIGNIFICANT CHANGE UP (ref 3.8–10.5)
WBC # FLD AUTO: 5.52 K/UL — SIGNIFICANT CHANGE UP (ref 3.8–10.5)

## 2021-11-01 PROCEDURE — 99239 HOSP IP/OBS DSCHRG MGMT >30: CPT

## 2021-11-01 PROCEDURE — 99223 1ST HOSP IP/OBS HIGH 75: CPT

## 2021-11-01 RX ORDER — APIXABAN 2.5 MG/1
2 TABLET, FILM COATED ORAL
Qty: 120 | Refills: 0
Start: 2021-11-01 | End: 2021-11-30

## 2021-11-01 RX ORDER — APIXABAN 2.5 MG/1
1 TABLET, FILM COATED ORAL
Qty: 74 | Refills: 0
Start: 2021-11-01

## 2021-11-01 RX ADMIN — NEBIVOLOL HYDROCHLORIDE 10 MILLIGRAM(S): 5 TABLET ORAL at 06:04

## 2021-11-01 RX ADMIN — APIXABAN 10 MILLIGRAM(S): 2.5 TABLET, FILM COATED ORAL at 01:47

## 2021-11-01 RX ADMIN — APIXABAN 10 MILLIGRAM(S): 2.5 TABLET, FILM COATED ORAL at 13:02

## 2021-11-01 NOTE — CONSULT NOTE ADULT - SUBJECTIVE AND OBJECTIVE BOX
ICU Consult Note:    Brief HPI:    77 y/o F pt with PMHx of HTN presents to ED c/o persistent shortness of breath x 3 weeks. Pt was seen by PMD Dr. Baron earlier today with echo done in office, pt found to have elevated pulmonary pressure to 80, and was instructed to come to ED for PE study.   Patient stated that from the last month she has been experiencing BEARDEN when walking a few blocks but in the last week has progressed to SOB when moving around in bed  Associated with chest pressure/heaviness   Denies calf pain, palpitations, hemoptysis, syncopal episode   No history of malignancy, recent travel, however states has been immobile since passing of her   Denies orthopnea, PND, LE edema, abdominal pain, headaches, dizzinesss    In ED, with increased shortness of breath, dyspnea with ambulation, but pt denies numbness, tingling, fever, congestion, cold, cough, and chest pressure. Otherwise, pt is well appearing and speaking in full sentences.    Consult reason: submassive PE   ED vitals: T Afebrile  HR 68  /60  SpO2 saturating 100% on 3 L NC   Imaging/EKG: CTPE:   Segmental and subsegmental pulmonary emboli involving all lobes. Enlarged pulmonary trunk and increased ratio of right ventricular to left ventricular size suggestive of right heart strain  Interventions: heparin 6500 IVP and infusion  Significant labs: pro-      Allergies    amlodipine (Unknown)    Intolerances      Home Medications:  Ativan 0.5 mg oral tablet: 0.25 milligram(s) orally 3 times a day, As Needed (30 Oct 2021 04:18)  Bystolic 10 mg oral tablet: 1 tab(s) orally once a day (30 Oct 2021 04:18)  Crestor 10 mg oral tablet: 1 tab(s) orally once a day (30 Oct 2021 04:18)  traMADol 50 mg oral tablet: 25 milligram(s) orally every 6 hours, As Needed (30 Oct 2021 04:18)      SOCIAL HX:     Smoking          ETOH/Illicit drugs          Occupation    PAST MEDICAL & SURGICAL HISTORY:  HTN (hypertension)        FAMILY HISTORY:  :    No known cardiovascular or pulmonary family history     ROS:  See HPI     PHYSICAL EXAM    ICU Vital Signs Last 24 Hrs  T(C): 36.7 (29 Oct 2021 23:11), Max: 36.7 (29 Oct 2021 23:11)  T(F): 98 (29 Oct 2021 23:11), Max: 98 (29 Oct 2021 23:11)  HR: 62 (30 Oct 2021 00:11) (62 - 76)  BP: 144/70 (30 Oct 2021 00:11) (144/70 - 164/87)  BP(mean): 100 (30 Oct 2021 00:11) (100 - 100)  ABP: --  ABP(mean): --  RR: 16 (30 Oct 2021 00:11) (16 - 16)  SpO2: 97% (30 Oct 2021 00:11) (97% - 100%)      General: Not in distress  HEENT:  JANNA              Lymphatic system: No LN  Lungs: Bilateral BS  Cardiovascular: Regular  Gastrointestinal: Soft, Positive BS  Musculoskeletal: No clubbing.  Moves all extremities.    Skin: Warm.  Intact  Neurological: No motor or sensory deficit       10-29-21 @ 07:01  -  10-30-21 @ 04:20  --------------------------------------------------------  IN:    Heparin Infusion: 30 mL  Total IN: 30 mL    OUT:  Total OUT: 0 mL    Total NET: 30 mL          LABS:                          14.6   8.80  )-----------( 325      ( 29 Oct 2021 18:03 )             44.1                                               10-29    138  |  103  |  14  ----------------------------<  105<H>  3.8   |  24  |  0.80    Ca    9.0      29 Oct 2021 18:03    TPro  7.3  /  Alb  4.3  /  TBili  0.5  /  DBili  x   /  AST  15  /  ALT  10  /  AlkPhos  104  10-29      PT/INR - ( 29 Oct 2021 18:03 )   PT: 13.3 sec;   INR: 1.11          PTT - ( 30 Oct 2021 02:02 )  PTT:>200.0 sec                                           CARDIAC MARKERS ( 29 Oct 2021 18:03 )  x     / 0.01 ng/mL / x     / x     / x                                                LIVER FUNCTIONS - ( 29 Oct 2021 18:03 )  Alb: 4.3 g/dL / Pro: 7.3 g/dL / ALK PHOS: 104 U/L / ALT: 10 U/L / AST: 15 U/L / GGT: x                                                  MEDICATIONS  (STANDING):  heparin  Infusion 1200 Unit(s)/Hr (12 mL/Hr) IV Continuous <Continuous>    MEDICATIONS  (PRN):        
CHIEF COMPLAINT:  Dyspnea  HISTORY OF PRESENT ILLNESS:  75 y/o F pt with PMHx of HTN presents to ED c/o persistent shortness of breath x 3 weeks. Patient stated that for the last month she has been experiencing BEARDEN when walking a few blocks, but in the last week has progressed to SOB when moving around in bed. It's also associated with chest pressure/heaviness. Pt was seen by PMD Dr. Baron earlier on day of admission and an echo was done in office and pt found to have elevated pulmonary pressure to 80, and was instructed to come to ED for PE study. Denies palpitations, LE swelling/pain, hemoptysis, syncope, fever, recent URI, cough, chest pain. Of note, pt recently lost her , and has been essentially bedbound since then. No history of malignancy and pt is up to date on cancer screenings.    Chart, PMH, medication and allergies reviewed  PAST MEDICAL & SURGICAL HISTORY:  HTN (hypertension)    No significant past surgical history        [  ] Diabetes   [ x ] Hypertension  [  ] Hyperlipidemia  [  ] CAD  [  ] PCI  [  ] CABG    PREVIOUS DIAGNOSTIC TESTING:    [ x ] Echocardiogram:  [  ]  Catheterization:  [  ] Stress Test:  	    MEDICATIONS:  MEDICATIONS  (STANDING):  apixaban 10 milliGRAM(s) Oral every 12 hours  atorvastatin 40 milliGRAM(s) Oral at bedtime  nebivolol 10 milliGRAM(s) Oral daily      FAMILY HISTORY:  No pertinent family history in first degree relatives        SOCIAL HISTORY:    [  ] Never smoker  [  ] Non-smoker  [  ] Smoker  [  ] Social alcohol use  [ x ] Former smoker    Allergies    amlodipine (Unknown)    Intolerances    	    REVIEW OF SYSTEMS:  CONSTITUTIONAL: No fever, weight loss, or fatigue  EYES: No eye pain, visual disturbances, or discharge  ENT:  No difficulty hearing, tinnitus, vertigo; No sinus or throat pain  NECK: No pain or stiffness  PULMONARY: +cough, no wheezing, chills or hemoptysis; +Shortness of Breath  CARDIOVASCULAR: +chest pain, no  palpitations, no passing out, dizziness, no leg swelling  GASTROINTESTINAL: No abdominal  pain. No nausea, vomiting, or hematemesis; No diarrhea or constipation. No melena or hematochezia.  GENITOURINARY: No dysuria, frequency, hematuria, or incontinence  NEUROLOGICAL: No headaches, memory loss, loss of strength, numbness, or tremors  SKIN: No itching, burning, rashes, or lesions   LYMPH Nodes: No enlarged glands  ENDOCRINE: No heat or cold intolerance; No hair loss  MUSCULOSKELETAL: No joint pain or swelling; No muscle, back, or extremity pain  PSYCHIATRIC: No depression, anxiety, mood swings, or difficulty sleeping  HEME/LYMPH: No easy bruising, or bleeding gums  ALLERGY AND IMMUNOLOGIC: No hives or eczema	    PHYSICAL EXAM:  T(C): 36.4 (11-01-21 @ 05:02), Max: 36.9 (10-31-21 @ 13:34)  HR: 65 (11-01-21 @ 08:23) (65 - 84)  BP: 129/63 (11-01-21 @ 08:23) (121/57 - 144/67)  RR: 17 (11-01-21 @ 08:23) (17 - 18)  SpO2: 94% (11-01-21 @ 08:23) (91% - 95%)  Wt(kg): --  I&O's Summary    31 Oct 2021 07:01  -  01 Nov 2021 07:00  --------------------------------------------------------  IN: 345 mL / OUT: 0 mL / NET: 345 mL        Appearance: Normal	  HEENT:   Normal oral mucosa, PERRL, EOMI	  Lymphatic: No lymphadenopathy  Cardiovascular: Normal S1 S2, No JVD, No murmur, No edema  Pulmonary: Lungs clear to auscultation	  Psychiatry: A & O x 3, Mood & affect appropriate  Gastrointestinal:  Soft, Non-tender, + BS	  Skin: No rashes, No ecchymoses, No cyanosis	  Neurologic: Non-focal  Extremities: Normal range of motion, No clubbing or cyanosis  	 	  CARDIAC MARKERS:                                12.7   5.52  )-----------( 314      ( 01 Nov 2021 08:05 )             38.7     11-01    137  |  103  |  16  ----------------------------<  94  4.2   |  23  |  0.81    Ca    9.0      01 Nov 2021 08:05  Phos  3.3     11-01  Mg     1.8     11-01    TPro  6.3  /  Alb  3.5  /  TBili  0.4  /  DBili  x   /  AST  16  /  ALT  9<L>  /  AlkPhos  84  11-01    proBNP:  Lipid Profile:  HgA1c:  TSH:  PTT - ( 31 Oct 2021 07:09 )  PTT:64.6 sec  TELEMETRY: 	    ECG:  NSR Normal	  RADIOLOGY:	   1. Mild symmetric left ventricular hypertrophy.   2. Normal left and right ventricular size and systolic function.   3. Aortic sclerosis without significant stenosis.   4. Mild aortic regurgitation.   5. Moderate tricuspid regurgitation.   6. Mild-to-moderate pulmonic regurgitation.   7. Pulmonary hypertension present, pulmonary artery systolic pressure is 67 mmHg.   8. No pericardial effusion.    ASSESSMENT/PLAN: 	  Pulmonary embolism–the patient is comfortable walking around the room. She has had dyspnea on exertion. She is not tachycardic. Her oxygen saturation is mildly reduced. The CAT scan shows multiple segmental pulmonary emboli. The echocardiogram shows normal right ventricular function with pulmonary hypertension. Patient feels improved on anticoagulation. Continue Eliquis.    Hypertension–her blood pressure is acceptable. On Bystolic. Consider tapering beta-blocker and treating with amlodipine.      Discussed with nursing staff.

## 2021-11-01 NOTE — DISCHARGE NOTE PROVIDER - NSDCFUSCHEDAPPT_GEN_ALL_CORE_FT
JOSIAH REBOLLEDO ; 11/10/2021 ; [facility] Card[facility] [address]  JOSIAH REBOLLEDO ; 11/18/2021 ; [facility] PreAdmits  JOSIAH REBOLLEDO ; 11/18/2021 ; [facility] Rad CAT  [address]   JOSIAH REBOLLEDO ; 12/02/2021 ; [facility] [address]

## 2021-11-01 NOTE — DISCHARGE NOTE PROVIDER - NSDCCPCAREPLAN_GEN_ALL_CORE_FT
PRINCIPAL DISCHARGE DIAGNOSIS  Diagnosis: Pulmonary embolus  Assessment and Plan of Treatment: Pulmonary embolism (or "PE") is a blockage in one or more of the blood vessels that supply blood to the lungs. Most often these blockages are caused by blood clots that form elsewhere and then travel to the lungs. In rare cases, blockages can also be caused by air bubbles, tiny globs of fat, or pieces of tumor that travel to the lungs. If a blood clot forms or gets stuck inside a blood vessel, it can clog the vessel and keep blood from getting where it needs to go. When that happens in the lungs, the lungs can get damaged. Having blocked arteries in the lung can also make it hard to breathe and can even lead to death. Common symptoms include panting, shortness of breath, or trouble breathing, sharp, knife-like chest pain when you breathe in or strain, coughing or coughing up blood or simply a rapid heartbeat. If you get any of these symptoms, especially if they happen over a short period of time (hours or days) or get worse quickly, call for an ambulance.   We started you on a blood thinner called Eliquis for treatment of the pulmonary embolus.   Instructions:  Please take Eliquis 10 mg every 12 hours until 11/6  Starting 11/7, please take Eliquis 5 mg every 12 hours   Please follow up with the Pulmonologist within 4 weeks of discharge for further management.       PRINCIPAL DISCHARGE DIAGNOSIS  Diagnosis: Pulmonary embolus  Assessment and Plan of Treatment: Pulmonary embolism (or "PE") is a blockage in one or more of the blood vessels that supply blood to the lungs. Most often these blockages are caused by blood clots that form elsewhere and then travel to the lungs. In rare cases, blockages can also be caused by air bubbles, tiny globs of fat, or pieces of tumor that travel to the lungs. If a blood clot forms or gets stuck inside a blood vessel, it can clog the vessel and keep blood from getting where it needs to go. When that happens in the lungs, the lungs can get damaged. Having blocked arteries in the lung can also make it hard to breathe and can even lead to death. Common symptoms include panting, shortness of breath, or trouble breathing, sharp, knife-like chest pain when you breathe in or strain, coughing or coughing up blood or simply a rapid heartbeat. If you get any of these symptoms, especially if they happen over a short period of time (hours or days) or get worse quickly, call for an ambulance.   We started you on a blood thinner called Eliquis for treatment of the pulmonary embolus.   Instructions:  Please take Eliquis 10 mg every 12 hours until 11/6  Starting 11/7, please take Eliquis 5 mg every 12 hours   Please take Eliquis 2 tabs (10 mg) orally every 12 hours for 5 additional days until 11/6  Starting 11/7, please take 1 tab (5 mg) orally every 12 hours until you follow up with Dr. Cantu.   Eliquis can increase your risk of bleeding. Call your doctor right away if you have any unusual bruising, prolonged bleeding, persistent nosebleeds, coughing up blood, vomit that is bloody or looks like coffee grounds, bloody/black/tarry stools.  Dr. Cantu's office will reach out to schedule an appointment within 4 weeks of discharge for further management.      SECONDARY DISCHARGE DIAGNOSES  Diagnosis: HTN (hypertension)  Assessment and Plan of Treatment: Hypertension is the medical term for high blood pressure. Blood pressure refers to the pressure that blood applies to the inner walls of the arteries. Arteries carry blood from the heart to other organs and parts of the body. Untreated high blood pressure increases the strain on the heart and arteries, eventually causing organ damage. High blood pressure increases the risk of heart failure, heart attack (myocardial infarction), stroke, and kidney failure. High blood pressure does not usually cause any symptoms. Treatment of hypertension usually begins with lifestyle changes. Making these lifestyle changes involves little or no risk. Recommended changes often include reducing the amount of salt in your diet, losing weight if you are overweight or obese, avoiding drinking too much alcohol, stopping smoking and exercising at least 30 minutes per day most days of the week. If you are prescribed medication for your hypertension it is important to take these as prescribed to prevent the possible complications of uncontrolled hypertension.

## 2021-11-01 NOTE — DISCHARGE NOTE PROVIDER - HOSPITAL COURSE
#Discharge: do not delete    Patient is a 76 year old female with PMHx of HTN presented with shortness of breath x 3 week, found to have provoked b/l PE in setting of recent immobility. LE dopplers + b/l DVT. Initially on Heparin gtt and now transitioned to Eliquis.       Hospital course (by problem):   #Pulmonary embolism  Patient admitted with SOB, found to have submassive PE on CT PE (Segmental and subsegmental pulmonary emboli involving all lobes with evidence of right heart strain). Likely provoked by recent immobility. Pro-BNP elevated.  Trop 0.01. LE dopplers + for b/l DVt in peroneal veins.   - Saturating well on room air at rest and while ambulating  - Initially on heparin gtt - now transitioned to PO Eliquis   - TTE Echo Complete w/o Contrast w/ Doppler   CONCLUSIONS:   1. Mild symmetric left ventricular hypertrophy.   2. Normal left and right ventricular size and systolic function.   3. Aortic sclerosis without significant stenosis.   4. Mild aortic regurgitation.   5. Moderate tricuspid regurgitation.   6. Mild-to-moderate pulmonic regurgitation.   7. Pulmonary hypertension present, pulmonary artery systolic pressure is 67 mmHg.   8. No pericardial effusion.   9. No prior echo is available for comparison.    #HTN (hypertension)  - Patient with h/o HTN, on Bystolic (nebivolol) 10mg daily at home. Was also told to start losartan but never started it.  - c/w home dose of Bystolic 10 mg qdaily.    Patient was discharged to: Home     New medications: Eliquis 10 mg BID for 1 week then Eliquis 5 mg BID   Changes to old medications: N/A  Medications that were stopped: N/A    Items to follow up as outpatient: Repeat CT Chest in 6-8 weeks     Physical exam at the time of discharge:  General: NAD  Head: NC/AT; MMM; PERRL; EOMI;  Neck: Supple; no JVD  Respiratory: CTAB; no wheezes/rales/rhonchi  Cardiovascular: Regular rhythm/rate; S1/S2+, no murmurs, rubs gallops   Gastrointestinal: Soft; NTND; bowel sounds normal and present  Extremities: WWP; no edema/cyanosis  Neurological: A&Ox3, CNII-XII grossly intact; no obvious focal deficits       #Discharge: do not delete    Patient is a 76 year old female with PMHx of HTN presented with shortness of breath x 3 week, found to have provoked b/l PE in setting of recent immobility. LE dopplers + b/l DVT. Initially on Heparin gtt and now transitioned to Eliquis.     Hospital course (by problem):   #Pulmonary embolism  Patient admitted with SOB, found to have submassive PE on CT PE (Segmental and subsegmental pulmonary emboli involving all lobes with evidence of right heart strain). Likely provoked by recent immobility. Pro-BNP elevated.  Trop 0.01. LE dopplers + for b/l DVt in peroneal veins.   - Saturating well on room air at rest and while ambulating  - Initially on heparin gtt - now transitioned to PO Eliquis   - TTE Echo Complete w/o Contrast w/ Doppler   CONCLUSIONS:   1. Mild symmetric left ventricular hypertrophy.   2. Normal left and right ventricular size and systolic function.   3. Aortic sclerosis without significant stenosis.   4. Mild aortic regurgitation.   5. Moderate tricuspid regurgitation.   6. Mild-to-moderate pulmonic regurgitation.   7. Pulmonary hypertension present, pulmonary artery systolic pressure is 67 mmHg.   8. No pericardial effusion.   9. No prior echo is available for comparison.    #HTN (hypertension)  - Patient with h/o HTN, on Bystolic (nebivolol) 10mg daily at home.   - c/w home dose of Bystolic 10 mg qdaily.    Patient was discharged to: Home     New medications: Eliquis 10 mg BID for 1 week then Eliquis 5 mg BID   Changes to old medications: N/A  Medications that were stopped: N/A    Items to follow up as outpatient: Pulmonary follow up in 2 weeks     Physical exam at the time of discharge:  General: NAD  Head: NC/AT; MMM; PERRL; EOMI;  Neck: Supple; no JVD  Respiratory: CTAB; no wheezes/rales/rhonchi  Cardiovascular: Regular rhythm/rate; S1/S2+, no murmurs, rubs gallops   Gastrointestinal: Soft; NTND; bowel sounds normal and present  Extremities: WWP; no edema/cyanosis  Neurological: A&Ox3, CNII-XII grossly intact; no obvious focal deficits

## 2021-11-01 NOTE — DISCHARGE NOTE PROVIDER - NSDCMRMEDTOKEN_GEN_ALL_CORE_FT
Ativan 0.5 mg oral tablet: 0.25 milligram(s) orally 3 times a day, As Needed  Bystolic 10 mg oral tablet: 1 tab(s) orally once a day  Crestor 10 mg oral tablet: 1 tab(s) orally once a day  traMADol 50 mg oral tablet: 25 milligram(s) orally every 6 hours, As Needed   apixaban 5 mg oral tablet: 2 tab(s) orally every 12 hours for 5 additional days until 11/6  Starting 11/7, please take 1 tab orally every 12 hours   Ativan 0.5 mg oral tablet: 0.25 milligram(s) orally 3 times a day, As Needed  Bystolic 10 mg oral tablet: 1 tab(s) orally once a day  Crestor 10 mg oral tablet: 1 tab(s) orally once a day  traMADol 50 mg oral tablet: 25 milligram(s) orally every 6 hours, As Needed   Ativan 0.5 mg oral tablet: 0.25 milligram(s) orally 3 times a day, As Needed  Bystolic 10 mg oral tablet: 1 tab(s) orally once a day  Crestor 10 mg oral tablet: 1 tab(s) orally once a day  Eliquis Starter Pack for Treatment of DVT and PE 5 mg oral tablet: 2 tab(s) orally every 12 hours for 5 additional days until 11/6  Starting 11/7, please take 1 tab orally every 12 hours   traMADol 50 mg oral tablet: 25 milligram(s) orally every 6 hours, As Needed

## 2021-11-01 NOTE — DISCHARGE NOTE NURSING/CASE MANAGEMENT/SOCIAL WORK - PATIENT PORTAL LINK FT
You can access the FollowMyHealth Patient Portal offered by [facility] by registering at the following website: [email or web address]. By joining [employer]’s FollowMyHealth portal, you will also be able to view your health information using other applications (apps) compatible with our system.

## 2021-11-01 NOTE — DISCHARGE NOTE PROVIDER - NSDCCPTREATMENT_GEN_ALL_CORE_FT
PRINCIPAL PROCEDURE  Procedure: CT chest w contrast  Findings and Treatment: IMPRESSION:  1.  Segmental and subsegmental pulmonary emboli involving all lobes. Enlarged pulmonary trunk and increased ratio of right ventricular to left ventricular size suggestive of right heart strain. No pulmonary infarction. These findings were communicated to Alyson ROBBINS at 8:35PM.  2.  Partially imaged questionable 4 cm heterogeneous mass versus lobular cortex in the left kidney. Further nonemergent evaluation with ultrasound is recommended.      SECONDARY PROCEDURE  Procedure: TTE (transthoracic echocardiography)  Findings and Treatment: CONCLUSIONS:   1. Mild symmetric left ventricular hypertrophy.   2. Normal left and right ventricular size and systolic function.   3. Aortic sclerosis without significant stenosis.   4. Mild aortic regurgitation.   5. Moderate tricuspid regurgitation.   6. Mild-to-moderate pulmonic regurgitation.   7. Pulmonary hypertension present, pulmonary artery systolic pressure is 67 mmHg.   8. No pericardial effusion.   9. No prior echo is available for comparison

## 2021-11-01 NOTE — DISCHARGE NOTE PROVIDER - CARE PROVIDER_API CALL
Katrin Cantu)  Critical Care Medicine; Pulmonary Disease  [address] [place] [address]  Phone: [phone]  Fax: [phone]  Follow Up Time:

## 2021-11-01 NOTE — DISCHARGE NOTE PROVIDER - NSDCFUADDAPPT_GEN_ALL_CORE_FT
You will follow up with Dr. Cantu within 1 month of discharge. Dr. Cantu's office will call you to schedule the appointment.

## 2021-11-01 NOTE — PROGRESS NOTE ADULT - ATTENDING COMMENTS
Patient seen and examined with house-staff during bedside rounds.  Resident note read, including vitals, physical findings, laboratory data, and radiological reports.   Revisions included below.  Direct personal management at bed side and extensive interpretation of the data.  Plan was outlined and discussed in details with the housestaff.  Decision making of high complexity  Action taken for acute disease activity to reflect the level of care provided:  - medication reconciliation  - review laboratory data  The patient is admitted with unprovoked pulmonary emboli.  Echocardiogram was unremarkable.  The biomarkers were negative.  The patient is on anticoagulation and tolerated well.  No evidence of bleeding.  The blood pressure is controlled.  Increase activity.  No requirement for oxygen supplementation.  We will discharge the patient tomorrow.  No diet restriction.  We will follow-up as an outpatient
Acute hypoxic respiratory failure with PE with DVT. Continue Eliquis. ECHO pending. Oxygen saturation with ambulation. May transfer to floor.
Acute hypoxic respiratory failure with PE with DVT. Heparin drip, change to Eliquis. ECHO. Oxygen saturation with ambulation. May transfer to floor.

## 2021-11-02 NOTE — DISCUSSION/SUMMARY
[FreeTextEntry1] : 76 year old female with PMHX of HTN, currently on Bystolic ( being weaned off and replaced on Losartan ) being evaluated for worsening shortness of breath over the course of a few weeks. ET baseline 6-7 blocks now stopping at 1/2 block with BEARDEN with associated epigastric tigheness. Symptoms resolve with rest. No associated fever, chills or sick contacts. \par \par BEARDEN: Worsening over the course of few weeks. EKG NSR 64 with elevated BP. O2 90-93 on room air. Bedside echo revealed elevated Pulm Pressure of 80mmHG. Last Echo (10/15/2021) Revealed 53mmHg. Sent to ER for evaluation for possible PE.  CTA Chest PE Protocol with IV contrast previously ordered but not done. \par I, Dr. Heidi Baron personally performed the evaluation and management services for this patient who presents today with a changed problem.  The evaluation and management includes conducting the examination assessing all conditions and establishing a new plan of care.  Today my ACP Arcelia Vigil was here and recorded the evaluation and management services.  The patient is worsening pulmonary hypertension.  The pulmonary hypertension is severe.  This could have severe sequelae.  The patient was referred to the emergency room and the CAT scan angiogram showed multiple segmental pulmonary emboli.  She was anticoagulated.  There is no evidence for an atrial septal defect.  Left ventricular ejection fraction is 65%.

## 2021-11-02 NOTE — HISTORY OF PRESENT ILLNESS
[FreeTextEntry1] : 76 year old female with PMHX of HTN, currently on Bystolic ( being weened off and replaced on Losartan ) being evaluated for worsening shortness of breath over the course of a few weeks. ET baseline 6-7 blocks now stopping at 1/2 block with BEARDEN with associated epigastric tigheness. Symptoms resolve with rest. No associated fever, chills or sick contacts.

## 2021-11-02 NOTE — REVIEW OF SYSTEMS
[SOB] : shortness of breath [Dyspnea on exertion] : dyspnea during exertion [Chest Discomfort] : chest discomfort [Fever] : no fever [Chills] : no chills [Lower Ext Edema] : no extremity edema [Leg Claudication] : no intermittent leg claudication [Palpitations] : no palpitations [Orthopnea] : no orthopnea [Syncope] : no syncope [Dizziness] : no dizziness [Numbness (Hypoesthesia)] : no numbness [Weakness] : no weakness [Speech Disturbance] : no speech disturbance

## 2021-11-03 PROBLEM — I10 ESSENTIAL (PRIMARY) HYPERTENSION: Chronic | Status: ACTIVE | Noted: 2021-10-29

## 2021-11-05 DIAGNOSIS — Z87.891 PERSONAL HISTORY OF NICOTINE DEPENDENCE: ICD-10-CM

## 2021-11-05 DIAGNOSIS — I82.453 ACUTE EMBOLISM AND THROMBOSIS OF PERONEAL VEIN, BILATERAL: ICD-10-CM

## 2021-11-05 DIAGNOSIS — I26.99 OTHER PULMONARY EMBOLISM WITHOUT ACUTE COR PULMONALE: ICD-10-CM

## 2021-11-05 DIAGNOSIS — I10 ESSENTIAL (PRIMARY) HYPERTENSION: ICD-10-CM

## 2021-11-05 DIAGNOSIS — J96.01 ACUTE RESPIRATORY FAILURE WITH HYPOXIA: ICD-10-CM

## 2021-11-05 DIAGNOSIS — Z74.09 OTHER REDUCED MOBILITY: ICD-10-CM

## 2021-11-10 ENCOUNTER — APPOINTMENT (OUTPATIENT)
Dept: HEART AND VASCULAR | Facility: CLINIC | Age: 76
End: 2021-11-10
Payer: MEDICARE

## 2021-11-10 ENCOUNTER — NON-APPOINTMENT (OUTPATIENT)
Age: 76
End: 2021-11-10

## 2021-11-10 VITALS
DIASTOLIC BLOOD PRESSURE: 64 MMHG | WEIGHT: 182 LBS | TEMPERATURE: 97.2 F | HEIGHT: 61 IN | OXYGEN SATURATION: 97 % | SYSTOLIC BLOOD PRESSURE: 124 MMHG | BODY MASS INDEX: 34.36 KG/M2 | HEART RATE: 71 BPM

## 2021-11-10 PROCEDURE — 93000 ELECTROCARDIOGRAM COMPLETE: CPT

## 2021-11-10 PROCEDURE — 99214 OFFICE O/P EST MOD 30 MIN: CPT | Mod: 25

## 2021-11-10 RX ORDER — LOSARTAN POTASSIUM 50 MG/1
50 TABLET, FILM COATED ORAL DAILY
Qty: 1 | Refills: 1 | Status: DISCONTINUED | COMMUNITY
Start: 2021-10-18 | End: 2021-11-10

## 2021-11-11 NOTE — PHYSICAL EXAM
[Well Developed] : well developed [Well Nourished] : well nourished [No Acute Distress] : no acute distress [Normal Conjunctiva] : normal conjunctiva [No Carotid Bruit] : no carotid bruit [Normal S1, S2] : normal S1, S2 [No Murmur] : no murmur [Normal Gait] : normal gait [No Edema] : no edema [Moves all extremities] : moves all extremities [No Focal Deficits] : no focal deficits [Normal Speech] : normal speech [Alert and Oriented] : alert and oriented [Normal memory] : normal memory

## 2021-11-13 NOTE — REVIEW OF SYSTEMS
[Fever] : no fever [Chills] : no chills [SOB] : no shortness of breath [Dyspnea on exertion] : not dyspnea during exertion [Chest Discomfort] : no chest discomfort [Lower Ext Edema] : no extremity edema [Leg Claudication] : no intermittent leg claudication [Palpitations] : no palpitations [Orthopnea] : no orthopnea [PND] : no PND [Dizziness] : no dizziness [Numbness (Hypoesthesia)] : no numbness [Weakness] : no weakness [Speech Disturbance] : no speech disturbance [Easy Bleeding] : no tendency for easy bleeding

## 2021-11-13 NOTE — HISTORY OF PRESENT ILLNESS
[FreeTextEntry1] : 76 year old female with PMHX of HTN seen here today for [facility] admission follow up 10/29/2021 - 11/01/2021 for SOB found to have bilateral pulmonary emboli and acute bilateral DVTs on lower extremity dopplers.\par \par Patient was reporting worsening dyspnea on exertion at short distances over the course of two weeks. She denies any preceding immobility, previous surgery, leg injury or hormonal treatments. Given increase pulmonary pressures and decreased O2 saturation, patient was sent to ER for evaluation. \par \par Since discharge, she is feeling almost back to baseline. She was able to walk 4 city blocks and 2 [address] briskly without any return of shortness of breath. \par \par She continues to report no palpitations or chest pains. She is tolerating the Eliquis well with no bleeding complications. She continues with her Bystolic 10mg and has yet to switch over to the Losartan 50mg. \par \par

## 2021-11-13 NOTE — DISCUSSION/SUMMARY
[FreeTextEntry1] : 76 year old female with PMHX of HTN and recently diagnosed bilateral DVTs and PE ( Eilquis ) here for follow up. \par \par BEARDEN: Resolved. Patient currently being treated with Eliquis for unprovoked DVT and PE. She has a follow up with pulmonary. \par Pulmonary HTN: Follow up with pulmonary. Continue with serial echocardiograms \par HTN: Controlled. Will continue with Bystolic 10mg and discontinue plan to switch to Losartan 50mg. \par DVT: Consider follow venous US in the future\par \par Follow up in 2 months\par I, Dr. Heidi Baron personally performed the evaluation and management services for this patient who presents today with a changed problem.  The evaluation and management includes conducting the examination assessing all conditions and establishing a new plan of care.  Today my ACP Arcelia Vigil was here and recorded the evaluation and management services.  Her blood pressure at home has been 140–145/60.  However the blood pressure today is normal.  She will continue Bystolic.  She declines a calcium score.

## 2021-11-15 ENCOUNTER — APPOINTMENT (OUTPATIENT)
Dept: UROLOGY | Facility: CLINIC | Age: 76
End: 2021-11-15
Payer: MEDICARE

## 2021-11-15 ENCOUNTER — NON-APPOINTMENT (OUTPATIENT)
Age: 76
End: 2021-11-15

## 2021-11-15 VITALS — SYSTOLIC BLOOD PRESSURE: 137 MMHG | HEART RATE: 67 BPM | TEMPERATURE: 98.2 F | DIASTOLIC BLOOD PRESSURE: 82 MMHG

## 2021-11-15 PROCEDURE — 76775 US EXAM ABDO BACK WALL LIM: CPT

## 2021-11-15 PROCEDURE — 99204 OFFICE O/P NEW MOD 45 MIN: CPT | Mod: 25

## 2021-11-15 NOTE — ASSESSMENT
[FreeTextEntry1] : JOSIAH REBOLLEDO  is a 76 year old female who presents for an evaluation of a left renal mass. She was evaluated and treated or a PE & DVT in October 2021 and has been feeling better. CT angio chest pulm showed a questionable 4 cm heterogeneous mass versus lobular cortex in the left kidney, incompletely visualized.\par \par renal US in office today reveals approx 3.5cm left renal mass\par Check CT abdomen for better characterization of clinical stage\par If tumor appears confined to kidney, recommend observation with reimaing in 3 months

## 2021-11-15 NOTE — HISTORY OF PRESENT ILLNESS
[FreeTextEntry1] : JOSIAH REBOLLEDO  is a 76 year old female who presents for an evaluation of a renal mass. On 10/29, she had shortness of breath while at her cardiologist appointment (Dr. Baron) and was sent to the hospital to be treated for a PE & DVT. She is now on Eliquis and is instructed to take it until her follow up with pulmonology (Dr. Cantu) on 11/30.  She reports feeling much better and is no longer experiencing shortness of breath. \par \par She has a history of colon tumors which were removed about 10 years ago. She reports they were not cancerous and she did not require chemo or radiation but had surgery. An abdominal sonogram on 4/2009 showed 2 cysts on the right kidney (1.47cmx1.24cm & 1.67 x 1.22) and a "tiny cyst" on the left kidney. MRI abdomen 01/2010 showed small bilateral renal cysts. \par  \par 10/29/2021  CT ANGIO CHEST PULM ART [facility]                      \par 1.  Segmental and subsegmental pulmonary emboli involving all lobes. Enlarged pulmonary trunk and increased ratio of right ventricular to left ventricular size suggestive of right heart strain. No pulmonary infarction. These findings were communicated to Alyson ROBBINS at 8:35PM.\par 2.  Partially imaged questionable 4 cm heterogeneous mass versus lobular cortex in the left kidney. Further nonemergent evaluation with ultrasound is recommended.

## 2021-11-15 NOTE — PHYSICAL EXAM
[General Appearance - Well Developed] : well developed [General Appearance - Well Nourished] : well nourished [Normal Appearance] : normal appearance [Well Groomed] : well groomed [General Appearance - In No Acute Distress] : no acute distress [Edema] : no peripheral edema [Respiration, Rhythm And Depth] : normal respiratory rhythm and effort [Exaggerated Use Of Accessory Muscles For Inspiration] : no accessory muscle use [Abdomen Soft] : soft [Abdomen Tenderness] : non-tender [Costovertebral Angle Tenderness] : no ~M costovertebral angle tenderness [] : no rash [Normal Station and Gait] : the gait and station were normal for the patient's age [No Focal Deficits] : no focal deficits [Oriented To Time, Place, And Person] : oriented to person, place, and time [Affect] : the affect was normal [Mood] : the mood was normal [Not Anxious] : not anxious [No Palpable Adenopathy] : no palpable adenopathy

## 2021-11-18 ENCOUNTER — APPOINTMENT (OUTPATIENT)
Dept: CT IMAGING | Facility: HOSPITAL | Age: 76
End: 2021-11-18

## 2021-11-18 LAB — URINE CYTOLOGY: NORMAL

## 2021-11-29 ENCOUNTER — NON-APPOINTMENT (OUTPATIENT)
Age: 76
End: 2021-11-29

## 2021-11-30 ENCOUNTER — APPOINTMENT (OUTPATIENT)
Dept: PULMONOLOGY | Facility: CLINIC | Age: 76
End: 2021-11-30
Payer: MEDICARE

## 2021-11-30 VITALS
BODY MASS INDEX: 34.55 KG/M2 | WEIGHT: 183 LBS | TEMPERATURE: 98.3 F | OXYGEN SATURATION: 95 % | DIASTOLIC BLOOD PRESSURE: 82 MMHG | HEIGHT: 61 IN | SYSTOLIC BLOOD PRESSURE: 150 MMHG | HEART RATE: 71 BPM

## 2021-11-30 PROCEDURE — 99214 OFFICE O/P EST MOD 30 MIN: CPT

## 2021-11-30 NOTE — ASSESSMENT
[FreeTextEntry1] : Pt is a 76 year old female with pmhx of htn and recent hospitalization (d/c on nov 1) for segmental and subsegmental b/l PE and lower extremity DVT now on apixaban.\par \par #PE/DVT\par Symptomatically feels well, somewhat dyspneic with exertion but overall doing well. taking apixaban without bleeding issues. CT reviewed with b/l and subsegmental PEs and b/l lower extremity DVTs in peroneal veins. Presumed unprovoked however has renal mass that is pending evaluation. She has not yet scheduled CT abdomen recommended by urology. Of note, She is going to [place] for the winter.\par -continue apixaban BID\par -renal mass evaluation by CT abd/pelvis \par -obtain V/Q scan in 2/2021 \par -PFTs\par \par #pulmonary hypertension\par 10/21 echo with mild LVH, nl LV systolic function, moderate TR, and PASP 67. \par -repeat echo in 12/2021\par \par Up to date on vaccines. \par \par follow up in 3 months

## 2021-11-30 NOTE — HISTORY OF PRESENT ILLNESS
[Never] : never [TextBox_4] : she is doing OK.  She is not bleeding from the nose nor the urine.  She is taking her medication on regular basis Still she has sense of overwhelmed with exertion but not sob.  She is trying to exercise.

## 2021-12-02 ENCOUNTER — APPOINTMENT (OUTPATIENT)
Dept: HEART AND VASCULAR | Facility: CLINIC | Age: 76
End: 2021-12-02
Payer: MEDICARE

## 2021-12-02 ENCOUNTER — NON-APPOINTMENT (OUTPATIENT)
Age: 76
End: 2021-12-02

## 2021-12-02 VITALS
TEMPERATURE: 98 F | HEIGHT: 61 IN | BODY MASS INDEX: 34.36 KG/M2 | HEART RATE: 65 BPM | SYSTOLIC BLOOD PRESSURE: 136 MMHG | WEIGHT: 182 LBS | DIASTOLIC BLOOD PRESSURE: 70 MMHG

## 2021-12-02 PROCEDURE — 99214 OFFICE O/P EST MOD 30 MIN: CPT | Mod: 25

## 2021-12-02 PROCEDURE — 93000 ELECTROCARDIOGRAM COMPLETE: CPT

## 2021-12-04 NOTE — DISCUSSION/SUMMARY
[FreeTextEntry1] : The patient has pulmonary emboli with pulmonary hypertension.  The EKG done for this shows normal sinus rhythm and is normal.  Her activity is limited but she denies dyspnea.  She will return for an echocardiogram to assess her pulmonary hypertension.  She will continue Eliquis.

## 2021-12-04 NOTE — HISTORY OF PRESENT ILLNESS
[FreeTextEntry1] : The patient will from [address] to my office.  She stopped because of joint pain.  She walks 2 blocks without stopping and has no dyspnea.  She has no dyspnea doing chores in the house.

## 2021-12-13 ENCOUNTER — APPOINTMENT (OUTPATIENT)
Dept: CT IMAGING | Facility: HOSPITAL | Age: 76
End: 2021-12-13
Payer: MEDICARE

## 2021-12-13 ENCOUNTER — RESULT REVIEW (OUTPATIENT)
Age: 76
End: 2021-12-13

## 2021-12-13 ENCOUNTER — OUTPATIENT (OUTPATIENT)
Dept: OUTPATIENT SERVICES | Facility: HOSPITAL | Age: 76
LOS: 1 days | End: 2021-12-13
Payer: MEDICARE

## 2021-12-13 PROCEDURE — 74170 CT ABD WO CNTRST FLWD CNTRST: CPT | Mod: 26,ME

## 2021-12-13 PROCEDURE — 74170 CT ABD WO CNTRST FLWD CNTRST: CPT | Mod: ME

## 2021-12-13 PROCEDURE — G1004: CPT

## 2021-12-17 ENCOUNTER — NON-APPOINTMENT (OUTPATIENT)
Age: 76
End: 2021-12-17

## 2021-12-22 PROCEDURE — 82550 ASSAY OF CK (CPK): CPT

## 2021-12-22 PROCEDURE — 96374 THER/PROPH/DIAG INJ IV PUSH: CPT

## 2021-12-22 PROCEDURE — 85379 FIBRIN DEGRADATION QUANT: CPT

## 2021-12-22 PROCEDURE — 87635 SARS-COV-2 COVID-19 AMP PRB: CPT

## 2021-12-22 PROCEDURE — 93970 EXTREMITY STUDY: CPT

## 2021-12-22 PROCEDURE — 84484 ASSAY OF TROPONIN QUANT: CPT

## 2021-12-22 PROCEDURE — 83880 ASSAY OF NATRIURETIC PEPTIDE: CPT

## 2021-12-22 PROCEDURE — 83735 ASSAY OF MAGNESIUM: CPT

## 2021-12-22 PROCEDURE — 85730 THROMBOPLASTIN TIME PARTIAL: CPT

## 2021-12-22 PROCEDURE — 71275 CT ANGIOGRAPHY CHEST: CPT | Mod: MC

## 2021-12-22 PROCEDURE — 85610 PROTHROMBIN TIME: CPT

## 2021-12-22 PROCEDURE — 36415 COLL VENOUS BLD VENIPUNCTURE: CPT

## 2021-12-22 PROCEDURE — 83605 ASSAY OF LACTIC ACID: CPT

## 2021-12-22 PROCEDURE — 84100 ASSAY OF PHOSPHORUS: CPT

## 2021-12-22 PROCEDURE — 99285 EMERGENCY DEPT VISIT HI MDM: CPT | Mod: 25

## 2021-12-22 PROCEDURE — 85025 COMPLETE CBC W/AUTO DIFF WBC: CPT

## 2021-12-22 PROCEDURE — 80053 COMPREHEN METABOLIC PANEL: CPT

## 2021-12-22 PROCEDURE — 82553 CREATINE MB FRACTION: CPT

## 2021-12-22 PROCEDURE — 93306 TTE W/DOPPLER COMPLETE: CPT

## 2021-12-27 ENCOUNTER — RX RENEWAL (OUTPATIENT)
Age: 76
End: 2021-12-27

## 2021-12-27 ENCOUNTER — NON-APPOINTMENT (OUTPATIENT)
Age: 76
End: 2021-12-27

## 2021-12-27 ENCOUNTER — APPOINTMENT (OUTPATIENT)
Dept: HEART AND VASCULAR | Facility: CLINIC | Age: 76
End: 2021-12-27
Payer: MEDICARE

## 2021-12-27 VITALS
OXYGEN SATURATION: 95 % | SYSTOLIC BLOOD PRESSURE: 128 MMHG | BODY MASS INDEX: 34.55 KG/M2 | TEMPERATURE: 97.3 F | WEIGHT: 183 LBS | DIASTOLIC BLOOD PRESSURE: 68 MMHG | HEIGHT: 61 IN | HEART RATE: 86 BPM

## 2021-12-27 PROCEDURE — 99214 OFFICE O/P EST MOD 30 MIN: CPT | Mod: 25

## 2021-12-27 PROCEDURE — 93306 TTE W/DOPPLER COMPLETE: CPT

## 2021-12-27 PROCEDURE — 93000 ELECTROCARDIOGRAM COMPLETE: CPT

## 2021-12-27 NOTE — HISTORY OF PRESENT ILLNESS
[FreeTextEntry1] : The patient is going to [place].  She walks 4-5 blocks and sometimes stops because of joint aches.  She has not had shortness of breath or chest discomfort.

## 2021-12-27 NOTE — DISCUSSION/SUMMARY
[FreeTextEntry1] : The patient has a history of deep vein thrombosis and pulmonary embolism.  She also had pulmonary hypertension.  The EKG done for pulmonary hypertension is normal.  The echocardiogram shows normal pulmonary pressures and an ejection fraction of 60-65%.  The venous Doppler shows no thrombus although the peroneal vein which had a DVT previously was not well visualized.  There is no thrombus above the knee.  The patient will continue Eliis.

## 2022-01-04 ENCOUNTER — RX RENEWAL (OUTPATIENT)
Age: 77
End: 2022-01-04

## 2022-05-16 ENCOUNTER — RX RENEWAL (OUTPATIENT)
Age: 77
End: 2022-05-16

## 2022-07-11 ENCOUNTER — APPOINTMENT (OUTPATIENT)
Dept: PULMONOLOGY | Facility: CLINIC | Age: 77
End: 2022-07-11

## 2022-07-11 VITALS
TEMPERATURE: 98.4 F | OXYGEN SATURATION: 96 % | DIASTOLIC BLOOD PRESSURE: 78 MMHG | BODY MASS INDEX: 34.74 KG/M2 | RESPIRATION RATE: 12 BRPM | SYSTOLIC BLOOD PRESSURE: 142 MMHG | HEIGHT: 61 IN | HEART RATE: 65 BPM | WEIGHT: 184 LBS

## 2022-07-11 PROCEDURE — 99213 OFFICE O/P EST LOW 20 MIN: CPT

## 2022-07-11 NOTE — HISTORY OF PRESENT ILLNESS
[TextBox_4] : She doing very well.  She is walking around no shortness of breath no swelling of the left.  No chest pain no palpitation.  No bleeding.  She wants to know when she can get off the blood thinners

## 2022-07-11 NOTE — ASSESSMENT
[FreeTextEntry1] : DVT  There is no clinical evidence of post thrombotic syndrome.  I will repeat the venous Doppler to rule out any chronic DVT  Pulmonary emboli  The patient was admitted with possible unprovoked pulmonary emboli but she at that time she was most sanitary not moving much.  I reviewed the CT scan and she has segmental subsegmental pulmonary emboli and there was no evidence of right ventricular strain.  The echocardiogram at that time revealed normal right ventricle and normal function but she had pulmonary hypertension with a pressure of 67.  The repeat echocardiogram was done 2 months after revealed resolution of the pulmonary hypertension.  At that time I will follow-up with a VQ scan to evaluate underlying chronic thromboembolic disease and decision whether to discontinue medication or extended period of prophylaxis.  Patient had a recent mammogram colonoscopy and all normal.  Pulmonary hypertension  Resolved.  Dyspnea on exertion resolved and the patient has limitation of her exercise capacity.  Baseline oxygen saturation is normal

## 2022-07-14 ENCOUNTER — OUTPATIENT (OUTPATIENT)
Dept: OUTPATIENT SERVICES | Facility: HOSPITAL | Age: 77
LOS: 1 days | End: 2022-07-14
Payer: MEDICARE

## 2022-07-14 ENCOUNTER — APPOINTMENT (OUTPATIENT)
Dept: ULTRASOUND IMAGING | Facility: HOSPITAL | Age: 77
End: 2022-07-14

## 2022-07-14 LAB — SARS-COV-2 N GENE NPH QL NAA+PROBE: NOT DETECTED

## 2022-07-14 PROCEDURE — 93970 EXTREMITY STUDY: CPT | Mod: 26

## 2022-07-14 PROCEDURE — 93970 EXTREMITY STUDY: CPT

## 2022-07-15 ENCOUNTER — OUTPATIENT (OUTPATIENT)
Dept: OUTPATIENT SERVICES | Facility: HOSPITAL | Age: 77
LOS: 1 days | End: 2022-07-15
Payer: MEDICARE

## 2022-07-15 ENCOUNTER — APPOINTMENT (OUTPATIENT)
Dept: NUCLEAR MEDICINE | Facility: HOSPITAL | Age: 77
End: 2022-07-15

## 2022-07-15 PROCEDURE — 78582 LUNG VENTILAT&PERFUS IMAGING: CPT

## 2022-07-15 PROCEDURE — 78582 LUNG VENTILAT&PERFUS IMAGING: CPT | Mod: 26,MH

## 2022-07-15 PROCEDURE — A9567: CPT

## 2022-07-15 PROCEDURE — A9540: CPT

## 2022-07-29 ENCOUNTER — APPOINTMENT (OUTPATIENT)
Dept: PULMONOLOGY | Facility: CLINIC | Age: 77
End: 2022-07-29

## 2022-08-19 ENCOUNTER — LABORATORY RESULT (OUTPATIENT)
Age: 77
End: 2022-08-19

## 2022-08-19 ENCOUNTER — NON-APPOINTMENT (OUTPATIENT)
Age: 77
End: 2022-08-19

## 2022-08-19 ENCOUNTER — APPOINTMENT (OUTPATIENT)
Dept: INTERNAL MEDICINE | Facility: CLINIC | Age: 77
End: 2022-08-19

## 2022-08-19 VITALS
HEIGHT: 61 IN | HEART RATE: 67 BPM | TEMPERATURE: 97.8 F | OXYGEN SATURATION: 95 % | BODY MASS INDEX: 35.12 KG/M2 | SYSTOLIC BLOOD PRESSURE: 137 MMHG | DIASTOLIC BLOOD PRESSURE: 72 MMHG | RESPIRATION RATE: 14 BRPM | WEIGHT: 186 LBS

## 2022-08-19 DIAGNOSIS — Z86.010 PERSONAL HISTORY OF COLONIC POLYPS: ICD-10-CM

## 2022-08-19 DIAGNOSIS — M54.50 LOW BACK PAIN, UNSPECIFIED: ICD-10-CM

## 2022-08-19 DIAGNOSIS — Z11.59 ENCOUNTER FOR SCREENING FOR OTHER VIRAL DISEASES: ICD-10-CM

## 2022-08-19 PROCEDURE — G0439: CPT

## 2022-08-19 PROCEDURE — 36415 COLL VENOUS BLD VENIPUNCTURE: CPT

## 2022-08-19 PROCEDURE — G0444 DEPRESSION SCREEN ANNUAL: CPT

## 2022-08-21 PROBLEM — Z86.010 HISTORY OF COLONIC POLYPS: Status: RESOLVED | Noted: 2022-08-21 | Resolved: 2022-08-21

## 2022-08-22 LAB
ALBUMIN SERPL ELPH-MCNC: 4.2 G/DL
ALP BLD-CCNC: 104 U/L
ALT SERPL-CCNC: 16 U/L
ANION GAP SERPL CALC-SCNC: 14 MMOL/L
APPEARANCE: ABNORMAL
AST SERPL-CCNC: 18 U/L
BASOPHILS # BLD AUTO: 0.1 K/UL
BASOPHILS NFR BLD AUTO: 1.1 %
BILIRUB SERPL-MCNC: 0.4 MG/DL
BILIRUBIN URINE: ABNORMAL
BLOOD URINE: NEGATIVE
BUN SERPL-MCNC: 17 MG/DL
CALCIUM SERPL-MCNC: 9.3 MG/DL
CHLORIDE SERPL-SCNC: 100 MMOL/L
CHOLEST SERPL-MCNC: 214 MG/DL
CO2 SERPL-SCNC: 25 MMOL/L
COLOR: YELLOW
COVID-19 NUCLEOCAPSID  GAM ANTIBODY INTERPRETATION: NEGATIVE
CREAT SERPL-MCNC: 0.79 MG/DL
EGFR: 76 ML/MIN/1.73M2
EOSINOPHIL # BLD AUTO: 0.22 K/UL
EOSINOPHIL NFR BLD AUTO: 2.4 %
ESTIMATED AVERAGE GLUCOSE: 117 MG/DL
GLUCOSE QUALITATIVE U: NEGATIVE
GLUCOSE SERPL-MCNC: 97 MG/DL
HBA1C MFR BLD HPLC: 5.7 %
HCT VFR BLD CALC: 43.8 %
HDLC SERPL-MCNC: 77 MG/DL
HGB BLD-MCNC: 14.1 G/DL
IMM GRANULOCYTES NFR BLD AUTO: 0.3 %
KETONES URINE: NORMAL
LDLC SERPL CALC-MCNC: 111 MG/DL
LEUKOCYTE ESTERASE URINE: ABNORMAL
LYMPHOCYTES # BLD AUTO: 3.52 K/UL
LYMPHOCYTES NFR BLD AUTO: 39.1 %
MAN DIFF?: NORMAL
MCHC RBC-ENTMCNC: 29.4 PG
MCHC RBC-ENTMCNC: 32.2 GM/DL
MCV RBC AUTO: 91.4 FL
MONOCYTES # BLD AUTO: 0.64 K/UL
MONOCYTES NFR BLD AUTO: 7.1 %
NEUTROPHILS # BLD AUTO: 4.49 K/UL
NEUTROPHILS NFR BLD AUTO: 50 %
NITRITE URINE: NEGATIVE
NONHDLC SERPL-MCNC: 137 MG/DL
PH URINE: 5.5
PLATELET # BLD AUTO: 385 K/UL
POTASSIUM SERPL-SCNC: 4.4 MMOL/L
PROT SERPL-MCNC: 6.6 G/DL
PROTEIN URINE: ABNORMAL
RBC # BLD: 4.79 M/UL
RBC # FLD: 13.2 %
SARS-COV-2 AB SERPL QL IA: 0.08 INDEX
SODIUM SERPL-SCNC: 139 MMOL/L
SPECIFIC GRAVITY URINE: 1.03
TRIGL SERPL-MCNC: 130 MG/DL
TSH SERPL-ACNC: 1.73 UIU/ML
UROBILINOGEN URINE: ABNORMAL
WBC # FLD AUTO: 9 K/UL

## 2022-08-22 NOTE — HISTORY OF PRESENT ILLNESS
[FreeTextEntry1] : annual physical [de-identified] : Pt is a 78 y/o F with PMHx of PE, pulmonary HTN who presents to the office today for annual physical.\par \par Pt c/o numbness in the R lateral aspect of the leg when standing. Makes it difficulty with walking.  Also mild Lower back pain.  Improved with physical therapy in the past but has not been in  a while.  is interested in returning to physical therapy.\par \par HCM:\par - Covid vaccine: needs 2nd booster\par - PNA vaccine: one PNA vaccine.\par - Influenza vaccine: Will get 2022\par - Shingrix vaccine: never\par - Mammo: annually- WNL\par - Colonoscopy: 2 years ago - colon polyps s/p partial colectomy \par - Exercise: minimal

## 2022-08-22 NOTE — HEALTH RISK ASSESSMENT
[0] : 2) Feeling down, depressed, or hopeless: Not at all (0) [PHQ-2 Negative - No further assessment needed] : PHQ-2 Negative - No further assessment needed [Patient reported colonoscopy was normal] : Patient reported colonoscopy was normal [HIV test declined] : HIV test declined [Hepatitis C test declined] : Hepatitis C test declined [Patient reported mammogram was normal] : Patient reported mammogram was normal [[id number]] : 0 [MammogramDate] : 2021 [ColonoscopyDate] : 2020

## 2022-08-22 NOTE — PHYSICAL EXAM
[No JVD] : no jugular venous distention [Normal Sclera/Conjunctiva] : normal sclera/conjunctiva [No Respiratory Distress] : no respiratory distress  [No Accessory Muscle Use] : no accessory muscle use [Clear to Auscultation] : lungs were clear to auscultation bilaterally [No Carotid Bruits] : no carotid bruits [No Edema] : there was no peripheral edema [No CVA Tenderness] : no CVA  tenderness [No Spinal Tenderness] : no spinal tenderness [No Rash] : no rash [No Focal Deficits] : no focal deficits [Normal] : affect was normal and insight and judgment were intact [de-identified] : Decreased breath sounds over LLL

## 2022-09-06 ENCOUNTER — APPOINTMENT (OUTPATIENT)
Dept: PULMONOLOGY | Facility: CLINIC | Age: 77
End: 2022-09-06

## 2022-09-06 VITALS
BODY MASS INDEX: 35.3 KG/M2 | TEMPERATURE: 98.2 F | DIASTOLIC BLOOD PRESSURE: 76 MMHG | OXYGEN SATURATION: 95 % | HEART RATE: 70 BPM | SYSTOLIC BLOOD PRESSURE: 127 MMHG | HEIGHT: 61 IN | WEIGHT: 187 LBS

## 2022-09-06 PROCEDURE — 99213 OFFICE O/P EST LOW 20 MIN: CPT

## 2022-09-06 RX ORDER — APIXABAN 5 MG/1
5 TABLET, FILM COATED ORAL
Qty: 60 | Refills: 2 | Status: DISCONTINUED | COMMUNITY
Start: 2022-05-16 | End: 2022-09-06

## 2022-09-06 NOTE — ASSESSMENT
[FreeTextEntry1] : Pulmonary emboli\par \par I discussed the case in detail with the patient I reviewed the CT scan angiogram with her and demonstrated the clot burden.  I also compared the results of the VQ scan which only revealed chronic emboli in the right middle lobe.  Compared to the CT scan there is improvement in her reperfusion.  I will repeat the VQ scan in 6 months.  I also reviewed the venous Doppler which also revealed resolution of the DVT.  At the end of September the patient will be changed into prophylaxis low treatment at apixaban 2 and half milligram twice daily indefinitely.  She is due for colonoscopy next year.  A mammogram this year was normal.  CT scan of the chest done in October 2021 did not reveal any abnormality.\par \par DVT\par \par The venous Doppler revealed resolution of the DVT\par \par Pulmonary hypertension\par \par Resolved the last echocardiogram from December 2021 revealed normal right ventricle with right function and no pulmonary hypertension\par \par Dyspnea on exertion\par \par Resolved and she will be participating the in physical therapy

## 2022-10-24 ENCOUNTER — RX RENEWAL (OUTPATIENT)
Age: 77
End: 2022-10-24

## 2022-10-25 ENCOUNTER — RX RENEWAL (OUTPATIENT)
Age: 77
End: 2022-10-25

## 2022-10-27 ENCOUNTER — RX RENEWAL (OUTPATIENT)
Age: 77
End: 2022-10-27

## 2022-11-02 ENCOUNTER — MED ADMIN CHARGE (OUTPATIENT)
Age: 77
End: 2022-11-02

## 2022-11-02 ENCOUNTER — APPOINTMENT (OUTPATIENT)
Dept: INTERNAL MEDICINE | Facility: CLINIC | Age: 77
End: 2022-11-02

## 2022-11-02 VITALS
BODY MASS INDEX: 35.5 KG/M2 | WEIGHT: 188 LBS | OXYGEN SATURATION: 96 % | SYSTOLIC BLOOD PRESSURE: 144 MMHG | TEMPERATURE: 98.6 F | DIASTOLIC BLOOD PRESSURE: 82 MMHG | HEART RATE: 71 BPM | HEIGHT: 61 IN

## 2022-11-02 DIAGNOSIS — N39.46 MIXED INCONTINENCE: ICD-10-CM

## 2022-11-02 DIAGNOSIS — N39.0 URINARY TRACT INFECTION, SITE NOT SPECIFIED: ICD-10-CM

## 2022-11-02 PROCEDURE — 99213 OFFICE O/P EST LOW 20 MIN: CPT

## 2022-11-02 RX ORDER — NITROFURANTOIN (MONOHYDRATE/MACROCRYSTALS) 25; 75 MG/1; MG/1
100 CAPSULE ORAL
Qty: 10 | Refills: 0 | Status: COMPLETED | COMMUNITY
Start: 2022-08-25 | End: 2022-11-02

## 2022-11-02 NOTE — HISTORY OF PRESENT ILLNESS
[FreeTextEntry1] : f/u UTI [de-identified] : uti\par - sx improved w/ nitrofurantoin\par - believes they it may be back. \par \par incontinence\par - has not seen GYN in 2 yrs\par - c/o constant leaking\par - does not want treatment even if they offered it.

## 2022-11-14 LAB
APPEARANCE: CLEAR
BACTERIA UR CULT: NORMAL
BILIRUBIN URINE: NEGATIVE
BLOOD URINE: NEGATIVE
COLOR: YELLOW
GLUCOSE QUALITATIVE U: NEGATIVE
KETONES URINE: NORMAL
LEUKOCYTE ESTERASE URINE: NEGATIVE
NITRITE URINE: NEGATIVE
PH URINE: 6
PROTEIN URINE: NORMAL
SPECIFIC GRAVITY URINE: 1.03
UROBILINOGEN URINE: ABNORMAL

## 2022-11-22 ENCOUNTER — APPOINTMENT (OUTPATIENT)
Dept: INTERNAL MEDICINE | Facility: CLINIC | Age: 77
End: 2022-11-22

## 2022-11-22 VITALS
OXYGEN SATURATION: 91 % | BODY MASS INDEX: 35.12 KG/M2 | HEIGHT: 61 IN | DIASTOLIC BLOOD PRESSURE: 84 MMHG | HEART RATE: 78 BPM | SYSTOLIC BLOOD PRESSURE: 154 MMHG | TEMPERATURE: 97.8 F | WEIGHT: 186 LBS

## 2022-11-22 VITALS — DIASTOLIC BLOOD PRESSURE: 79 MMHG | SYSTOLIC BLOOD PRESSURE: 133 MMHG

## 2022-11-22 PROCEDURE — 99212 OFFICE O/P EST SF 10 MIN: CPT

## 2022-11-22 NOTE — HISTORY OF PRESENT ILLNESS
[FreeTextEntry1] : HTN check [de-identified] : Pt is a 76 y/o F with PMHx of HTN who presents to the office today for HTN check\par \par HTN:\par - Range from a highest reading of 164/89  and lowest of 138/85 with home log. Most readings > 140/80 \par - on bystolic and HCTZ \par - Pt denies CP, SOB, LE edema\par - Pt is feeling well\par

## 2022-11-22 NOTE — PHYSICAL EXAM
[No Acute Distress] : no acute distress [Well-Appearing] : well-appearing [No Respiratory Distress] : no respiratory distress  [Normal Rate] : normal rate

## 2022-11-28 ENCOUNTER — APPOINTMENT (OUTPATIENT)
Dept: PULMONOLOGY | Facility: CLINIC | Age: 77
End: 2022-11-28

## 2022-11-28 VITALS
HEIGHT: 61 IN | SYSTOLIC BLOOD PRESSURE: 133 MMHG | OXYGEN SATURATION: 94 % | WEIGHT: 186 LBS | HEART RATE: 71 BPM | DIASTOLIC BLOOD PRESSURE: 75 MMHG | BODY MASS INDEX: 35.12 KG/M2

## 2022-11-28 PROCEDURE — 99213 OFFICE O/P EST LOW 20 MIN: CPT

## 2022-12-28 ENCOUNTER — APPOINTMENT (OUTPATIENT)
Dept: HEART AND VASCULAR | Facility: CLINIC | Age: 77
End: 2022-12-28
Payer: MEDICARE

## 2022-12-28 PROCEDURE — 99442: CPT | Mod: 95

## 2023-04-02 ENCOUNTER — RX RENEWAL (OUTPATIENT)
Age: 78
End: 2023-04-02

## 2023-04-26 ENCOUNTER — RX RENEWAL (OUTPATIENT)
Age: 78
End: 2023-04-26

## 2023-06-19 ENCOUNTER — APPOINTMENT (OUTPATIENT)
Dept: HEART AND VASCULAR | Facility: CLINIC | Age: 78
End: 2023-06-19
Payer: MEDICARE

## 2023-06-19 VITALS
HEIGHT: 61 IN | WEIGHT: 190 LBS | BODY MASS INDEX: 35.87 KG/M2 | HEART RATE: 67 BPM | TEMPERATURE: 97.2 F | OXYGEN SATURATION: 96 % | SYSTOLIC BLOOD PRESSURE: 122 MMHG | DIASTOLIC BLOOD PRESSURE: 73 MMHG

## 2023-06-19 DIAGNOSIS — R06.09 OTHER FORMS OF DYSPNEA: ICD-10-CM

## 2023-06-19 PROCEDURE — 93000 ELECTROCARDIOGRAM COMPLETE: CPT

## 2023-06-19 PROCEDURE — 99214 OFFICE O/P EST MOD 30 MIN: CPT | Mod: 25

## 2023-06-19 NOTE — DISCUSSION/SUMMARY
[FreeTextEntry1] : 78 year old female with HTN, PE, Pulm HTN and DVT (08/2022) here for follow up. \par \par BEARDEN: Without chest pains. Will get an echocardiogram to assess cardiac structure. \par HTN: Goal < 130/80. Controlled. Continue HCTZ 12.5mg QD and Nebivolol 10mg QD\par HLD: Goal < 100. Slightly elevated. Will continue to work on diet and exercise and follow up with PCP for labs. Continue with Crestor 10mg QD\par PE: Continue follow up with pulmonary and Eliquis 2.5mg BID\par \par Follow up in 4 months with echocardiogram\par \par I, Dr. Heidi Baron personally performed the evaluation and management services for this patient who presents today with a new problem.  The evaluation and management includes conducting the examination assessing all conditions and establishing a new plan of care.  Today my ACP Arcelia Vigil was here and recorded the evaluation and management services.\par \par Patient has shortness of breath cleaning her house which is new.  However she is able to walk 2 blocks without difficulty.  She will attempt to exercise more and improve her diet.  She is very overweight and deconditioned.\par

## 2023-06-19 NOTE — PHYSICAL EXAM
[Well Developed] : well developed [Well Nourished] : well nourished [No Acute Distress] : no acute distress [No Carotid Bruit] : no carotid bruit [No Murmur] : no murmur [Clear Lung Fields] : clear lung fields [Good Air Entry] : good air entry [Tenderness] : tenderness [Normal Gait] : normal gait [Moves all extremities] : moves all extremities [No Focal Deficits] : no focal deficits [Alert and Oriented] : alert and oriented [Normal memory] : normal memory [de-identified] : mild swelling of right ankle

## 2023-06-19 NOTE — REVIEW OF SYSTEMS
[Dyspnea on exertion] : dyspnea during exertion [Fever] : no fever [Chills] : no chills [SOB] : no shortness of breath [Chest Discomfort] : no chest discomfort [Lower Ext Edema] : no extremity edema [Leg Claudication] : no intermittent leg claudication [Palpitations] : no palpitations [Orthopnea] : no orthopnea [Dizziness] : no dizziness [Numbness (Hypoesthesia)] : no numbness [Weakness] : no weakness [Speech Disturbance] : no speech disturbance [Easy Bleeding] : no tendency for easy bleeding

## 2023-06-19 NOTE — HISTORY OF PRESENT ILLNESS
[FreeTextEntry1] : 78 year old female with HTN, PE, Pulm HTN and DVT (08/2022) here for follow up. \par \par Since last visit, she has been residing in [place] for the past 6 months. She did have a mechanical fall in 05/2023. Patient footing was not stable when she got up to walk. Her ankle twisted and she fell to her side. No trauma to the head. She had no preceding dizziness, headaches or chest pains. \par \par She has not been very active. She is limited by her lower back and hip pains. She does reports mild dyspnea with activities like cleaning her room but she feels this is long standing. She reports no chest pains. She has minor swelling of twisted right ankle however no edema. No associated orthopnea or PND.\par \par Patient denies any palpitations, shortness of breath at rest, dizziness, falls, syncope or neuro focal deficits. \par \par She has been eating fruits / veggies with not lots of breads / pastas or meats. \par \par Previous Work UP\par LABS ( 08/19/2022 ) total Chol 214, HDL 77, , A1c 5.7\par NM VQ scan ( 07/15/2022 ): Chronic mismatch in the right middle lobe. No new PE\par Venous US ( 07/15/2022 ): resolution of previous DVT since 10/2021. No new DVT\par Venous US ( 12/27/2021 ) NO DVT or significant reflux disease\par

## 2023-06-27 ENCOUNTER — APPOINTMENT (OUTPATIENT)
Dept: NUCLEAR MEDICINE | Facility: HOSPITAL | Age: 78
End: 2023-06-27

## 2023-06-27 ENCOUNTER — OUTPATIENT (OUTPATIENT)
Dept: OUTPATIENT SERVICES | Facility: HOSPITAL | Age: 78
LOS: 1 days | End: 2023-06-27
Payer: MEDICARE

## 2023-06-27 PROCEDURE — 78582 LUNG VENTILAT&PERFUS IMAGING: CPT | Mod: 26,MH

## 2023-06-27 PROCEDURE — A9567: CPT

## 2023-06-27 PROCEDURE — A9540: CPT

## 2023-06-27 PROCEDURE — 78582 LUNG VENTILAT&PERFUS IMAGING: CPT

## 2023-07-09 NOTE — HISTORY OF PRESENT ILLNESS
[Never] : never [TextBox_4] : sheis doing better with the new dosage of the medication.  She is walking around and she is not sob.  She is going to PT twice a week and doing well.  She feels she is getting stronger.  She is motivated to get out.  She is going to [place] for 6 month an ants to get PT [ESS] : 0

## 2023-07-09 NOTE — ASSESSMENT
[FreeTextEntry1] : DVT pulmonary emboli and pulmonary hypertension\par \par Patient is clinically stable with no limitation of exercise capacity.  The patient is participating in physical therapy and his exercise capacity improved.  Her oxygen saturation improved.  Exam is unremarkable.  No evidence of neither failure or recurrence of therapy.  Patient will be traveling to [place] for 6 months.  The patient need to continue the physical therapy.  The last echocardiogram did not reveal any evidence of pulmonary hypertension.  The VQ scan revealed residual chronic  PE and the right middle lobe.\par \par The patient is stable to continue on the current regimen

## 2023-07-10 ENCOUNTER — APPOINTMENT (OUTPATIENT)
Dept: PULMONOLOGY | Facility: CLINIC | Age: 78
End: 2023-07-10

## 2023-07-17 ENCOUNTER — RX RENEWAL (OUTPATIENT)
Age: 78
End: 2023-07-17

## 2023-07-20 ENCOUNTER — RX RENEWAL (OUTPATIENT)
Age: 78
End: 2023-07-20

## 2023-08-15 ENCOUNTER — APPOINTMENT (OUTPATIENT)
Dept: PULMONOLOGY | Facility: CLINIC | Age: 78
End: 2023-08-15
Payer: MEDICARE

## 2023-08-15 VITALS
DIASTOLIC BLOOD PRESSURE: 81 MMHG | SYSTOLIC BLOOD PRESSURE: 146 MMHG | HEIGHT: 61 IN | HEART RATE: 71 BPM | WEIGHT: 190 LBS | OXYGEN SATURATION: 92 % | BODY MASS INDEX: 35.87 KG/M2

## 2023-08-15 DIAGNOSIS — I82.409 ACUTE EMBOLISM AND THROMBOSIS OF UNSPECIFIED DEEP VEINS OF UNSPECIFIED LOWER EXTREMITY: ICD-10-CM

## 2023-08-15 DIAGNOSIS — I27.20 PULMONARY HYPERTENSION, UNSPECIFIED: ICD-10-CM

## 2023-08-15 PROCEDURE — 99213 OFFICE O/P EST LOW 20 MIN: CPT

## 2023-08-15 NOTE — PHYSICAL EXAM
[No Acute Distress] : no acute distress [Normal Oropharynx] : normal oropharynx [Normal Appearance] : normal appearance [Normal Rate/Rhythm] : normal rate/rhythm [Normal S1, S2] : normal s1, s2 [No Resp Distress] : no resp distress [Clear to Auscultation Bilaterally] : clear to auscultation bilaterally [Benign] : benign [No Edema] : no edema [No Focal Deficits] : no focal deficits [Oriented x3] : oriented x3

## 2023-08-15 NOTE — END OF VISIT
[FreeTextEntry3] : she is stable and indictaed to her need to be on the current dose of ap[ixaban await ECHO [Time Spent: ___ minutes] : I have spent [unfilled] minutes of time on the encounter. [>50% of the face to face encounter time was spent on counseling and/or coordination of care for ___] : Greater than 50% of the face to face encounter time was spent on counseling and/or coordination of care for [unfilled]

## 2023-08-15 NOTE — ASSESSMENT
[FreeTextEntry1] : 78 F w/ hx of PE, DVT bilateral, and PH likely related to CTED here for follow up.   # PE # DVT # PH # CTED on V/Q - PE/b/l DVT in 2021, unprovoked. ON elliquis since then - V/Q scan June 2023 with large perfusion defect RML, stable from prior  - last echo w/ elevated PASP - will continue elliquis 2.5 mg BID for now. Will get a repeat Echo w/ Cardiology to evaluate for improvement in R heart pressures

## 2023-08-15 NOTE — REVIEW OF SYSTEMS
[Abdominal Pain] : abdominal pain [Fever] : no fever [Chills] : no chills [Cough] : no cough [Hemoptysis] : no hemoptysis [Sputum] : no sputum [Dyspnea] : no dyspnea [SOB on Exertion] : no sob on exertion [TextBox_69] : mild abdo pain

## 2023-09-12 ENCOUNTER — APPOINTMENT (OUTPATIENT)
Dept: INTERNAL MEDICINE | Facility: CLINIC | Age: 78
End: 2023-09-12
Payer: MEDICARE

## 2023-09-12 VITALS
SYSTOLIC BLOOD PRESSURE: 130 MMHG | RESPIRATION RATE: 18 BRPM | OXYGEN SATURATION: 94 % | DIASTOLIC BLOOD PRESSURE: 76 MMHG | HEART RATE: 65 BPM | BODY MASS INDEX: 32.78 KG/M2 | HEIGHT: 63 IN | TEMPERATURE: 98.3 F | WEIGHT: 185 LBS

## 2023-09-12 DIAGNOSIS — R09.82 POSTNASAL DRIP: ICD-10-CM

## 2023-09-12 DIAGNOSIS — R53.83 OTHER FATIGUE: ICD-10-CM

## 2023-09-12 DIAGNOSIS — N28.89 OTHER SPECIFIED DISORDERS OF KIDNEY AND URETER: ICD-10-CM

## 2023-09-12 PROCEDURE — G0008: CPT

## 2023-09-12 PROCEDURE — 90662 IIV NO PRSV INCREASED AG IM: CPT

## 2023-09-12 PROCEDURE — 99214 OFFICE O/P EST MOD 30 MIN: CPT | Mod: 25

## 2023-09-12 PROCEDURE — 36415 COLL VENOUS BLD VENIPUNCTURE: CPT

## 2023-09-12 RX ORDER — FLUTICASONE FUROATE 27.5 UG/1
27.5 SPRAY, METERED NASAL DAILY
Qty: 1 | Refills: 0 | Status: ACTIVE | COMMUNITY
Start: 2023-09-12 | End: 1900-01-01

## 2023-09-18 LAB
25(OH)D3 SERPL-MCNC: 21.3 NG/ML
ALBUMIN SERPL ELPH-MCNC: 4.1 G/DL
ALP BLD-CCNC: 88 U/L
ALT SERPL-CCNC: 13 U/L
ANION GAP SERPL CALC-SCNC: 14 MMOL/L
APPEARANCE: CLEAR
AST SERPL-CCNC: 16 U/L
BACTERIA: ABNORMAL /HPF
BASOPHILS # BLD AUTO: 0.04 K/UL
BASOPHILS NFR BLD AUTO: 0.6 %
BILIRUB SERPL-MCNC: 0.5 MG/DL
BILIRUBIN URINE: ABNORMAL
BLOOD URINE: NEGATIVE
BUN SERPL-MCNC: 22 MG/DL
CALCIUM SERPL-MCNC: 9 MG/DL
CAST: 2 /LPF
CHLORIDE SERPL-SCNC: 100 MMOL/L
CHOLEST SERPL-MCNC: 168 MG/DL
CO2 SERPL-SCNC: 25 MMOL/L
COLOR: NORMAL
CREAT SERPL-MCNC: 0.8 MG/DL
EGFR: 74 ML/MIN/1.73M2
EOSINOPHIL # BLD AUTO: 0.1 K/UL
EOSINOPHIL NFR BLD AUTO: 1.4 %
EPITHELIAL CELLS: 6 /HPF
ESTIMATED AVERAGE GLUCOSE: 123 MG/DL
FOLATE SERPL-MCNC: 17.6 NG/ML
GLUCOSE QUALITATIVE U: NEGATIVE MG/DL
GLUCOSE SERPL-MCNC: 100 MG/DL
HBA1C MFR BLD HPLC: 5.9 %
HCT VFR BLD CALC: 43.6 %
HDLC SERPL-MCNC: 64 MG/DL
HGB BLD-MCNC: 13.6 G/DL
IMM GRANULOCYTES NFR BLD AUTO: 0.3 %
KETONES URINE: ABNORMAL MG/DL
LDLC SERPL CALC-MCNC: 83 MG/DL
LEUKOCYTE ESTERASE URINE: ABNORMAL
LYMPHOCYTES # BLD AUTO: 2.76 K/UL
LYMPHOCYTES NFR BLD AUTO: 38.3 %
MAN DIFF?: NORMAL
MCHC RBC-ENTMCNC: 28.2 PG
MCHC RBC-ENTMCNC: 31.2 GM/DL
MCV RBC AUTO: 90.3 FL
MICROSCOPIC-UA: NORMAL
MONOCYTES # BLD AUTO: 0.73 K/UL
MONOCYTES NFR BLD AUTO: 10.1 %
NEUTROPHILS # BLD AUTO: 3.56 K/UL
NEUTROPHILS NFR BLD AUTO: 49.3 %
NITRITE URINE: NEGATIVE
NONHDLC SERPL-MCNC: 104 MG/DL
PH URINE: 6.5
PLATELET # BLD AUTO: 441 K/UL
POTASSIUM SERPL-SCNC: 4.3 MMOL/L
PROT SERPL-MCNC: 6.6 G/DL
PROTEIN URINE: 30 MG/DL
RBC # BLD: 4.83 M/UL
RBC # FLD: 13.6 %
RED BLOOD CELLS URINE: 3 /HPF
SODIUM SERPL-SCNC: 139 MMOL/L
SPECIFIC GRAVITY URINE: 1.03
TRIGL SERPL-MCNC: 122 MG/DL
TSH SERPL-ACNC: 0.98 UIU/ML
UROBILINOGEN URINE: 0.2 MG/DL
VIT B12 SERPL-MCNC: 492 PG/ML
WBC # FLD AUTO: 7.21 K/UL
WHITE BLOOD CELLS URINE: 7 /HPF

## 2023-09-20 RX ORDER — LORAZEPAM 0.5 MG/1
0.5 TABLET ORAL
Qty: 90 | Refills: 0 | Status: ACTIVE | COMMUNITY
Start: 1900-01-01 | End: 1900-01-01

## 2023-09-28 ENCOUNTER — APPOINTMENT (OUTPATIENT)
Dept: INTERNAL MEDICINE | Facility: CLINIC | Age: 78
End: 2023-09-28
Payer: MEDICARE

## 2023-09-28 VITALS
TEMPERATURE: 97.7 F | DIASTOLIC BLOOD PRESSURE: 80 MMHG | SYSTOLIC BLOOD PRESSURE: 128 MMHG | WEIGHT: 185 LBS | HEIGHT: 63 IN | HEART RATE: 75 BPM | BODY MASS INDEX: 32.78 KG/M2 | OXYGEN SATURATION: 95 %

## 2023-09-28 DIAGNOSIS — Z00.00 ENCOUNTER FOR GENERAL ADULT MEDICAL EXAMINATION W/OUT ABNORMAL FINDINGS: ICD-10-CM

## 2023-09-28 DIAGNOSIS — R82.90 UNSPECIFIED ABNORMAL FINDINGS IN URINE: ICD-10-CM

## 2023-09-28 PROCEDURE — G0439: CPT

## 2023-09-28 RX ORDER — TRAMADOL HYDROCHLORIDE 50 MG/1
50 TABLET, COATED ORAL
Qty: 60 | Refills: 0 | Status: ACTIVE | COMMUNITY
Start: 1900-01-01 | End: 1900-01-01

## 2023-09-28 RX ORDER — MULTIVIT-MIN/IRON/FOLIC ACID/K 18-600-40
50 MCG CAPSULE ORAL
Qty: 90 | Refills: 3 | Status: ACTIVE | COMMUNITY
Start: 2023-09-28 | End: 1900-01-01

## 2023-10-02 DIAGNOSIS — R82.71 BACTERIURIA: ICD-10-CM

## 2023-10-02 LAB
APPEARANCE: CLEAR
BACTERIA UR CULT: NORMAL
BACTERIA: ABNORMAL /HPF
BILIRUBIN URINE: ABNORMAL
BLOOD URINE: NEGATIVE
CAST: 12 /LPF
COLOR: NORMAL
EPITHELIAL CELLS: 15 /HPF
GLUCOSE QUALITATIVE U: NEGATIVE MG/DL
HYALINE CASTS: PRESENT
KETONES URINE: ABNORMAL MG/DL
LEUKOCYTE ESTERASE URINE: ABNORMAL
MICROSCOPIC-UA: NORMAL
NITRITE URINE: NEGATIVE
PH URINE: 5.5
PROTEIN URINE: 30 MG/DL
RED BLOOD CELLS URINE: 3 /HPF
REVIEW: NORMAL
SPECIFIC GRAVITY URINE: >1.03
UROBILINOGEN URINE: 1 MG/DL
WHITE BLOOD CELLS URINE: 39 /HPF

## 2023-10-13 ENCOUNTER — APPOINTMENT (OUTPATIENT)
Dept: ORTHOPEDIC SURGERY | Facility: CLINIC | Age: 78
End: 2023-10-13
Payer: MEDICARE

## 2023-10-13 ENCOUNTER — OUTPATIENT (OUTPATIENT)
Dept: OUTPATIENT SERVICES | Facility: HOSPITAL | Age: 78
LOS: 1 days | End: 2023-10-13
Payer: MEDICARE

## 2023-10-13 ENCOUNTER — RESULT REVIEW (OUTPATIENT)
Age: 78
End: 2023-10-13

## 2023-10-13 VITALS
WEIGHT: 185 LBS | HEIGHT: 63 IN | SYSTOLIC BLOOD PRESSURE: 131 MMHG | OXYGEN SATURATION: 93 % | HEART RATE: 77 BPM | DIASTOLIC BLOOD PRESSURE: 83 MMHG | BODY MASS INDEX: 32.78 KG/M2

## 2023-10-13 DIAGNOSIS — M70.62 TROCHANTERIC BURSITIS, RIGHT HIP: ICD-10-CM

## 2023-10-13 DIAGNOSIS — M70.61 TROCHANTERIC BURSITIS, RIGHT HIP: ICD-10-CM

## 2023-10-13 DIAGNOSIS — Z86.39 PERSONAL HISTORY OF OTHER ENDOCRINE, NUTRITIONAL AND METABOLIC DISEASE: ICD-10-CM

## 2023-10-13 DIAGNOSIS — Z86.79 PERSONAL HISTORY OF OTHER DISEASES OF THE CIRCULATORY SYSTEM: ICD-10-CM

## 2023-10-13 DIAGNOSIS — Z86.718 PERSONAL HISTORY OF OTHER VENOUS THROMBOSIS AND EMBOLISM: ICD-10-CM

## 2023-10-13 DIAGNOSIS — M46.1 SACROILIITIS, NOT ELSEWHERE CLASSIFIED: ICD-10-CM

## 2023-10-13 PROCEDURE — 99205 OFFICE O/P NEW HI 60 MIN: CPT

## 2023-10-13 PROCEDURE — 72020 X-RAY EXAM OF SPINE 1 VIEW: CPT

## 2023-10-13 PROCEDURE — 73521 X-RAY EXAM HIPS BI 2 VIEWS: CPT | Mod: 26

## 2023-10-13 PROCEDURE — 73521 X-RAY EXAM HIPS BI 2 VIEWS: CPT

## 2023-10-13 PROCEDURE — 72020 X-RAY EXAM OF SPINE 1 VIEW: CPT | Mod: 26

## 2023-10-16 ENCOUNTER — APPOINTMENT (OUTPATIENT)
Dept: HEART AND VASCULAR | Facility: CLINIC | Age: 78
End: 2023-10-16
Payer: MEDICARE

## 2023-10-16 VITALS
HEART RATE: 75 BPM | WEIGHT: 185 LBS | SYSTOLIC BLOOD PRESSURE: 128 MMHG | BODY MASS INDEX: 32.78 KG/M2 | OXYGEN SATURATION: 95 % | DIASTOLIC BLOOD PRESSURE: 82 MMHG | HEIGHT: 63 IN

## 2023-10-16 DIAGNOSIS — I82.463 BILATERAL CALF MUSCULAR VEIN ACUTE EMBOLISM AND THROMBOSIS: ICD-10-CM

## 2023-10-16 PROCEDURE — 99214 OFFICE O/P EST MOD 30 MIN: CPT | Mod: 25

## 2023-10-16 PROCEDURE — 93000 ELECTROCARDIOGRAM COMPLETE: CPT

## 2023-10-16 PROCEDURE — 93306 TTE W/DOPPLER COMPLETE: CPT

## 2023-10-17 PROBLEM — I82.463: Status: ACTIVE | Noted: 2023-10-17

## 2023-11-02 ENCOUNTER — RX RENEWAL (OUTPATIENT)
Age: 78
End: 2023-11-02

## 2023-11-15 ENCOUNTER — APPOINTMENT (OUTPATIENT)
Dept: UROLOGY | Facility: CLINIC | Age: 78
End: 2023-11-15
Payer: MEDICARE

## 2023-11-15 VITALS
SYSTOLIC BLOOD PRESSURE: 157 MMHG | OXYGEN SATURATION: 94 % | TEMPERATURE: 98 F | HEART RATE: 71 BPM | DIASTOLIC BLOOD PRESSURE: 80 MMHG

## 2023-11-15 PROCEDURE — 99214 OFFICE O/P EST MOD 30 MIN: CPT

## 2023-11-15 RX ORDER — ESTRADIOL 0.1 MG/G
0.1 CREAM VAGINAL
Qty: 1 | Refills: 11 | Status: ACTIVE | COMMUNITY
Start: 2023-11-15 | End: 1900-01-01

## 2023-11-22 ENCOUNTER — APPOINTMENT (OUTPATIENT)
Dept: HEMATOLOGY ONCOLOGY | Facility: CLINIC | Age: 78
End: 2023-11-22
Payer: MEDICARE

## 2023-11-22 ENCOUNTER — LABORATORY RESULT (OUTPATIENT)
Age: 78
End: 2023-11-22

## 2023-11-22 VITALS
SYSTOLIC BLOOD PRESSURE: 142 MMHG | HEIGHT: 63 IN | BODY MASS INDEX: 33.66 KG/M2 | HEART RATE: 84 BPM | TEMPERATURE: 96.5 F | OXYGEN SATURATION: 97 % | WEIGHT: 190 LBS | DIASTOLIC BLOOD PRESSURE: 80 MMHG

## 2023-11-22 DIAGNOSIS — Z80.0 FAMILY HISTORY OF MALIGNANT NEOPLASM OF DIGESTIVE ORGANS: ICD-10-CM

## 2023-11-22 DIAGNOSIS — Z86.711 PERSONAL HISTORY OF PULMONARY EMBOLISM: ICD-10-CM

## 2023-11-22 DIAGNOSIS — Z86.718 PERSONAL HISTORY OF OTHER VENOUS THROMBOSIS AND EMBOLISM: ICD-10-CM

## 2023-11-22 DIAGNOSIS — Z82.3 FAMILY HISTORY OF STROKE: ICD-10-CM

## 2023-11-22 PROCEDURE — 99204 OFFICE O/P NEW MOD 45 MIN: CPT | Mod: 25

## 2023-11-22 PROCEDURE — 36415 COLL VENOUS BLD VENIPUNCTURE: CPT

## 2023-11-23 ENCOUNTER — NON-APPOINTMENT (OUTPATIENT)
Age: 78
End: 2023-11-23

## 2023-11-24 LAB
ALBUMIN SERPL ELPH-MCNC: 4.2 G/DL
ALP BLD-CCNC: 97 U/L
ALT SERPL-CCNC: 10 U/L
ANION GAP SERPL CALC-SCNC: 15 MMOL/L
ANISOCYTOSIS BLD QL: SLIGHT
APTT BLD: 32 SEC
AST SERPL-CCNC: 15 U/L
BASOPHILS # BLD AUTO: 0.25 K/UL
BASOPHILS # BLD AUTO: 0.25 K/UL
BASOPHILS NFR BLD AUTO: 2.7 %
BASOPHILS NFR BLD AUTO: 2.7 %
BILIRUB SERPL-MCNC: 0.6 MG/DL
BUN SERPL-MCNC: 20 MG/DL
CALCIUM SERPL-MCNC: 9 MG/DL
CHLORIDE SERPL-SCNC: 102 MMOL/L
CO2 SERPL-SCNC: 23 MMOL/L
CREAT SERPL-MCNC: 0.93 MG/DL
EGFR: 62 ML/MIN/1.73M2
EOSINOPHIL # BLD AUTO: 0.17 K/UL
EOSINOPHIL # BLD AUTO: 0.17 K/UL
EOSINOPHIL NFR BLD AUTO: 1.8 %
EOSINOPHIL NFR BLD AUTO: 1.8 %
ERYTHROCYTE [SEDIMENTATION RATE] IN BLOOD BY WESTERGREN METHOD: 18 MM/HR
FERRITIN SERPL-MCNC: 47 NG/ML
GIANT PLATELETS BLD QL SMEAR: PRESENT
GLUCOSE SERPL-MCNC: 103 MG/DL
HCT VFR BLD CALC: 42.2 %
HGB BLD-MCNC: 13.7 G/DL
INR PPP: 1.05
IRON SATN MFR SERPL: 17 %
IRON SERPL-MCNC: 54 UG/DL
LYMPHOCYTES # BLD AUTO: 2.44 K/UL
LYMPHOCYTES # BLD AUTO: 2.44 K/UL
LYMPHOCYTES NFR BLD AUTO: 26.5 %
LYMPHOCYTES NFR BLD AUTO: 26.5 %
MAN DIFF?: NORMAL
MCHC RBC-ENTMCNC: 28.6 PG
MCHC RBC-ENTMCNC: 32.5 GM/DL
MCV RBC AUTO: 88.1 FL
MICROCYTES BLD QL SMEAR: SLIGHT
MONOCYTES # BLD AUTO: 0.41 K/UL
MONOCYTES # BLD AUTO: 0.41 K/UL
MONOCYTES NFR BLD AUTO: 4.4 %
MONOCYTES NFR BLD AUTO: 4.4 %
MSMEAR: NORMAL
NEUTROPHILS # BLD AUTO: 5.96 K/UL
NEUTROPHILS # BLD AUTO: 5.96 K/UL
NEUTROPHILS NFR BLD AUTO: 63.7 %
NEUTROPHILS NFR BLD AUTO: 63.7 %
NEUTS BAND NFR BLD MANUAL: 0.9 %
OVALOCYTES BLD QL SMEAR: SLIGHT
PLAT MORPH BLD: ABNORMAL
PLATELET # BLD AUTO: 413 K/UL
POIKILOCYTOSIS BLD QL SMEAR: SLIGHT
POLYCHROMASIA BLD QL SMEAR: SLIGHT
POTASSIUM SERPL-SCNC: 4.2 MMOL/L
PROT SERPL-MCNC: 7 G/DL
PT BLD: 11.9 SEC
RBC # BLD: 4.79 M/UL
RBC # FLD: 13.9 %
RBC BLD AUTO: ABNORMAL
SODIUM SERPL-SCNC: 140 MMOL/L
SPHEROCYTES BLD QL SMEAR: SLIGHT
TIBC SERPL-MCNC: 315 UG/DL
UIBC SERPL-MCNC: 261 UG/DL
WBC # FLD AUTO: 9.22 K/UL

## 2023-11-26 LAB
B2 GLYCOPROT1 IGA SERPL IA-ACNC: <5 SAU
B2 GLYCOPROT1 IGG SER-ACNC: <5 SGU
B2 GLYCOPROT1 IGM SER-ACNC: <5 SMU
CARDIOLIPIN AB SER IA-ACNC: NEGATIVE
CARDIOLIPIN IGM SER-MCNC: 8.8 MPL
CARDIOLIPIN IGM SER-MCNC: <5 GPL
DEPRECATED CARDIOLIPIN IGA SER: <5 APL

## 2023-11-30 LAB
JAK2 P.V617F BLD/T QL: NORMAL
REFLEX:: NORMAL

## 2023-12-07 ENCOUNTER — NON-APPOINTMENT (OUTPATIENT)
Age: 78
End: 2023-12-07

## 2024-01-15 ENCOUNTER — RX RENEWAL (OUTPATIENT)
Age: 79
End: 2024-01-15

## 2024-01-17 RX ORDER — HYDROCHLOROTHIAZIDE 12.5 MG/1
12.5 TABLET ORAL
Qty: 90 | Refills: 2 | Status: ACTIVE | COMMUNITY
Start: 2022-11-02 | End: 1900-01-01

## 2024-01-19 ENCOUNTER — APPOINTMENT (OUTPATIENT)
Dept: INTERNAL MEDICINE | Facility: CLINIC | Age: 79
End: 2024-01-19
Payer: MEDICARE

## 2024-01-19 DIAGNOSIS — F41.9 ANXIETY DISORDER, UNSPECIFIED: ICD-10-CM

## 2024-01-19 DIAGNOSIS — E78.5 HYPERLIPIDEMIA, UNSPECIFIED: ICD-10-CM

## 2024-01-19 DIAGNOSIS — M79.10 MYALGIA, UNSPECIFIED SITE: ICD-10-CM

## 2024-01-19 DIAGNOSIS — I74.9 EMBOLISM AND THROMBOSIS OF UNSPECIFIED ARTERY: ICD-10-CM

## 2024-01-19 PROCEDURE — ZZZZZ: CPT

## 2024-01-19 NOTE — HISTORY OF PRESENT ILLNESS
[Other Location: e.g. School (Enter Location, City,State)___] : at [unfilled], at the time of the visit. [Medical Office: ([facility])___] : at the medical office located in  [Verbal consent obtained from patient] : the patient, [unfilled] [FreeTextEntry1] : review and disucssion of records/medication changes [de-identified] : Pt is doing well overall.  Saw heme/oncology for opinion on continuing AC. She was also recommended pt continue Eliquis which pt has been complaint with.    Has been off of Crestor for about a month. Her myalgia has improved immensely.  She is not interested in statin therapy at this time. She is in [place] right now and declines getting labs checked there.  Will repeat lipids when she returns in June 2024

## 2024-05-02 ENCOUNTER — RX RENEWAL (OUTPATIENT)
Age: 79
End: 2024-05-02

## 2024-05-02 RX ORDER — NEBIVOLOL 10 MG/1
10 TABLET ORAL
Qty: 90 | Refills: 0 | Status: ACTIVE | COMMUNITY
Start: 2022-01-04 | End: 1900-01-01

## 2024-06-24 ENCOUNTER — APPOINTMENT (OUTPATIENT)
Dept: INTERNAL MEDICINE | Facility: CLINIC | Age: 79
End: 2024-06-24
Payer: MEDICARE

## 2024-06-24 VITALS
DIASTOLIC BLOOD PRESSURE: 74 MMHG | HEIGHT: 63 IN | HEART RATE: 74 BPM | OXYGEN SATURATION: 95 % | BODY MASS INDEX: 33.66 KG/M2 | SYSTOLIC BLOOD PRESSURE: 146 MMHG | RESPIRATION RATE: 18 BRPM | TEMPERATURE: 97.9 F | WEIGHT: 190 LBS

## 2024-06-24 VITALS — DIASTOLIC BLOOD PRESSURE: 69 MMHG | SYSTOLIC BLOOD PRESSURE: 106 MMHG

## 2024-06-24 DIAGNOSIS — I10 ESSENTIAL (PRIMARY) HYPERTENSION: ICD-10-CM

## 2024-06-24 DIAGNOSIS — F32.A DEPRESSION, UNSPECIFIED: ICD-10-CM

## 2024-06-24 DIAGNOSIS — D75.839 THROMBOCYTOSIS, UNSPECIFIED: ICD-10-CM

## 2024-06-24 DIAGNOSIS — M25.559 PAIN IN UNSPECIFIED HIP: ICD-10-CM

## 2024-06-24 DIAGNOSIS — M81.0 AGE-RELATED OSTEOPOROSIS W/OUT CURRENT PATHOLOGICAL FRACTURE: ICD-10-CM

## 2024-06-24 PROCEDURE — 99214 OFFICE O/P EST MOD 30 MIN: CPT

## 2024-06-24 PROCEDURE — G2211 COMPLEX E/M VISIT ADD ON: CPT

## 2024-06-24 PROCEDURE — 36415 COLL VENOUS BLD VENIPUNCTURE: CPT

## 2024-06-25 RX ORDER — APIXABAN 2.5 MG/1
2.5 TABLET, FILM COATED ORAL
Qty: 180 | Refills: 0 | Status: ACTIVE | COMMUNITY
Start: 2022-09-06 | End: 1900-01-01

## 2024-06-28 LAB
ANION GAP SERPL CALC-SCNC: 16 MMOL/L
BASOPHILS # BLD AUTO: 0.1 K/UL
BASOPHILS NFR BLD AUTO: 1.3 %
BUN SERPL-MCNC: 14 MG/DL
CALCIUM SERPL-MCNC: 9.7 MG/DL
CHLORIDE SERPL-SCNC: 101 MMOL/L
CHOLEST SERPL-MCNC: 313 MG/DL
CO2 SERPL-SCNC: 21 MMOL/L
CREAT SERPL-MCNC: 0.86 MG/DL
EGFR: 68 ML/MIN/1.73M2
EOSINOPHIL # BLD AUTO: 0.2 K/UL
EOSINOPHIL NFR BLD AUTO: 2.6 %
GLUCOSE SERPL-MCNC: 100 MG/DL
HCT VFR BLD CALC: 43.9 %
HDLC SERPL-MCNC: 63 MG/DL
HGB BLD-MCNC: 13.7 G/DL
IMM GRANULOCYTES NFR BLD AUTO: 0.3 %
LDLC SERPL CALC-MCNC: 204 MG/DL
LYMPHOCYTES # BLD AUTO: 2.51 K/UL
LYMPHOCYTES NFR BLD AUTO: 32.5 %
MAN DIFF?: NORMAL
MCHC RBC-ENTMCNC: 28.6 PG
MCHC RBC-ENTMCNC: 31.2 GM/DL
MCV RBC AUTO: 91.6 FL
MONOCYTES # BLD AUTO: 0.59 K/UL
MONOCYTES NFR BLD AUTO: 7.6 %
NEUTROPHILS # BLD AUTO: 4.31 K/UL
NEUTROPHILS NFR BLD AUTO: 55.7 %
NONHDLC SERPL-MCNC: 250 MG/DL
PLATELET # BLD AUTO: 406 K/UL
POTASSIUM SERPL-SCNC: 4.5 MMOL/L
RBC # BLD: 4.79 M/UL
RBC # FLD: 13.9 %
SODIUM SERPL-SCNC: 138 MMOL/L
TRIGL SERPL-MCNC: 237 MG/DL
WBC # FLD AUTO: 7.73 K/UL

## 2024-08-05 ENCOUNTER — RX RENEWAL (OUTPATIENT)
Age: 79
End: 2024-08-05

## 2024-09-27 ENCOUNTER — APPOINTMENT (OUTPATIENT)
Dept: HEART AND VASCULAR | Facility: CLINIC | Age: 79
End: 2024-09-27
Payer: MEDICARE

## 2024-09-27 ENCOUNTER — NON-APPOINTMENT (OUTPATIENT)
Age: 79
End: 2024-09-27

## 2024-09-27 VITALS
BODY MASS INDEX: 33.66 KG/M2 | SYSTOLIC BLOOD PRESSURE: 149 MMHG | HEIGHT: 63 IN | DIASTOLIC BLOOD PRESSURE: 85 MMHG | HEART RATE: 82 BPM | TEMPERATURE: 97.2 F | WEIGHT: 190 LBS | OXYGEN SATURATION: 97 %

## 2024-09-27 VITALS — DIASTOLIC BLOOD PRESSURE: 81 MMHG | SYSTOLIC BLOOD PRESSURE: 135 MMHG

## 2024-09-27 VITALS — SYSTOLIC BLOOD PRESSURE: 132 MMHG | DIASTOLIC BLOOD PRESSURE: 85 MMHG

## 2024-09-27 DIAGNOSIS — Z86.718 PERSONAL HISTORY OF OTHER VENOUS THROMBOSIS AND EMBOLISM: ICD-10-CM

## 2024-09-27 DIAGNOSIS — R94.31 ABNORMAL ELECTROCARDIOGRAM [ECG] [EKG]: ICD-10-CM

## 2024-09-27 PROCEDURE — 93970 EXTREMITY STUDY: CPT

## 2024-09-27 PROCEDURE — 93000 ELECTROCARDIOGRAM COMPLETE: CPT

## 2024-09-27 PROCEDURE — 99214 OFFICE O/P EST MOD 30 MIN: CPT | Mod: 25

## 2024-09-29 PROBLEM — R94.31 ABNORMAL EKG: Status: ACTIVE | Noted: 2024-09-29

## 2024-09-29 NOTE — REVIEW OF SYSTEMS
[Negative] : Heme/Lymph [Fever] : no fever [Chills] : no chills [Dyspnea on exertion] : not dyspnea during exertion [Chest Discomfort] : no chest discomfort [Lower Ext Edema] : no extremity edema [Nausea] : no nausea [Vomiting] : no vomiting [Diarrhea] : diarrhea [Dizziness] : no dizziness [Numbness (Hypoesthesia)] : no numbness [Weakness] : no weakness [Speech Disturbance] : no speech disturbance [Easy Bleeding] : no tendency for easy bleeding

## 2024-09-29 NOTE — HISTORY OF PRESENT ILLNESS
[FreeTextEntry1] : 79 year old female with HTN, HLD, pulmonary hypertension, history of  DVT and PE in Dec 2021 here for follow up and duplex venous ultrasound.   Since last visit 10/16/2023 she remains well and has no acute concerns. She does not exercise but tries to walk as much as she can. She has to take a break after walking 1-2 blocks due to back pain but is able to continue after a short rest. Denies exertional chest pain or dyspnea. She does not take stairs or walk up hills.   She previously discontinued rosuvastatin due to leg pain. Her recent bloodwork showed elevated cholesterol levels and she was instructed to restart rosuvastatin 10. She has been tolerating the medication for the past 3 months without leg pain.  She repots that her leg edema has improved significantly since her last visit.  She was evaluated by hematologist Dr. Tosha Carrizales who recommended continuation of anticoagulation.   Pt denies chest pain or dyspnea at rest, palpitations, orthopnea, PND, lightheadedness, syncope, neurofocal deficits.  ======================== Previous Work [facility] (6/2024): K 4.5 Cr 0.86 H/h 13.7/43.9    HDL 63   Echo (10/2023): 1. Left ventricular cavity is normal. Left ventricular systolic function is normal with an ejection fraction visually estimated at 60 to 65 %. There are no regional wall motion abnormalities seen. 2. Normal left ventricular diastolic function. 3. Mild left ventricular hypertrophy. 4. The right ventricular cavity is normal in size, normal wall thickness and normal systolic function. 5. Trace aortic regurgitation. 6. Mild thickening of the aortic valve leaflets. 7. No pericardial effusion seen.  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 No new embolization.  NM VQ scan (07/2022): Chronic mismatch in the right middle lobe. No new PE  Venous US ( 07/15/2022 ): resolution of previous DVT since 10/2021. No new DVT  Venous US ( 12/27/2021 ) NO DVT or significant reflux disease

## 2024-09-29 NOTE — DISCUSSION/SUMMARY
[EKG obtained to assist in diagnosis and management of assessed problem(s)] : EKG obtained to assist in diagnosis and management of assessed problem(s) [FreeTextEntry1] : 79 year old female with HTN, HLD, pulmonary hypertension, history of DVT and PE in Dec 2021 here for follow up and duplex venous ultrasound.  EKG today NSR 78 bpm mild ST depression  History of DVT and PE: asymptomatic. Duplex venous ultrasound performed today revealed no new DVT. Continue to monitor. Continue Eliquis 2.5 BID. HLD / CAD risk: No chest pain, fair exercise tolerance. EKG NSR. Echocardiogram in 10/2023 showed no regional wall abnormalities. Significant coronary disease is unlikely. LDL goal < 100, most recent . Continue Rosuvastatin 10. Encouraged healthy diet low in saturated fats and aerobic exercise as tolerated. HTN: BP goal < 130/80, mildly elevated in office today but acceptable. Continue Nebivolol 10 and HCTZ 12.5. Recommend measuring and recording blood pressures at home. Encouraged low salt diet and aerobic exercise as tolerated.   Follow up 1 year for echocardiogram.  I have discussed the case with PROSPER Mauricio. I have personally performed a history, physical exam, and my own medical decision making. I have reviewed the note and agree with the findings and plan.   The patient has edema.  She has no DVT on venous Doppler.  Her activity is limited by back pain.  She has minimal ST depression on EKG.  She has no chest discomfort.  Her blood pressure is acceptable.  She will continue rosuvastatin.  She will return for an echocardiogram.

## 2024-09-30 ENCOUNTER — APPOINTMENT (OUTPATIENT)
Dept: INTERNAL MEDICINE | Facility: CLINIC | Age: 79
End: 2024-09-30
Payer: MEDICARE

## 2024-09-30 VITALS
BODY MASS INDEX: 33.88 KG/M2 | SYSTOLIC BLOOD PRESSURE: 136 MMHG | HEIGHT: 63 IN | DIASTOLIC BLOOD PRESSURE: 77 MMHG | HEART RATE: 68 BPM | TEMPERATURE: 98.6 F | WEIGHT: 191.19 LBS | OXYGEN SATURATION: 95 %

## 2024-09-30 DIAGNOSIS — E78.5 HYPERLIPIDEMIA, UNSPECIFIED: ICD-10-CM

## 2024-09-30 DIAGNOSIS — Z00.00 ENCOUNTER FOR GENERAL ADULT MEDICAL EXAMINATION W/OUT ABNORMAL FINDINGS: ICD-10-CM

## 2024-09-30 DIAGNOSIS — M81.0 AGE-RELATED OSTEOPOROSIS W/OUT CURRENT PATHOLOGICAL FRACTURE: ICD-10-CM

## 2024-09-30 DIAGNOSIS — I74.9 EMBOLISM AND THROMBOSIS OF UNSPECIFIED ARTERY: ICD-10-CM

## 2024-09-30 DIAGNOSIS — I82.463 BILATERAL CALF MUSCULAR VEIN ACUTE EMBOLISM AND THROMBOSIS: ICD-10-CM

## 2024-09-30 DIAGNOSIS — I10 ESSENTIAL (PRIMARY) HYPERTENSION: ICD-10-CM

## 2024-09-30 DIAGNOSIS — I82.409 ACUTE EMBOLISM AND THROMBOSIS OF UNSPECIFIED DEEP VEINS OF UNSPECIFIED LOWER EXTREMITY: ICD-10-CM

## 2024-09-30 DIAGNOSIS — M54.50 LOW BACK PAIN, UNSPECIFIED: ICD-10-CM

## 2024-09-30 PROCEDURE — G0008: CPT

## 2024-09-30 PROCEDURE — 36415 COLL VENOUS BLD VENIPUNCTURE: CPT

## 2024-09-30 PROCEDURE — 90662 IIV NO PRSV INCREASED AG IM: CPT

## 2024-09-30 PROCEDURE — G0444 DEPRESSION SCREEN ANNUAL: CPT

## 2024-09-30 PROCEDURE — G0439: CPT

## 2024-09-30 NOTE — HISTORY OF PRESENT ILLNESS
[FreeTextEntry1] : Here for an annual physical  [de-identified] : Back Pain and Leg Numbness Back pain worsening with walking and numbness in the right leg.PT provided relief in the past but has since stopped. Not taking any medication for intermittent pain. U/S of b/l legs done 9/27  No evidence of superficial venous thrombosis is noted at this time.  Mental Health: Mild depression: Engaging in reading and social activities. Interacts with friends. Anxiety : Takes Lorazepam as needed . Needs refill   HTN:  Hydrochlorothiazide and Nebivolol. Regular follow-ups with your cardiologist. Last appointment 9/27.    HX of chronic thrombosis  Continue with lifelong anticoag per heme/onc  HLD Takes Rosuvastatin   Pain Management Takes half the dose of Tramadol as needed. It sometimes causes constipation. Otherwise has a bowel movement 1-2x a day.     Surgical History in 2010 .Appendix removal , and partial laparoscopic colectomy for 2 tumors  Has a non bothersome hernia, except when wearing tight clothes. Surgery not indicated  HCM - Colonoscopy: 4 years ago, no polyps  - Ophthalmology: UTD - Dentist: UTD  - Derm: UTD

## 2024-09-30 NOTE — REVIEW OF SYSTEMS
[Vision Problems] : vision problems [Dyspnea on Exertion] : dyspnea on exertion [Constipation] : constipation [Frequency] : frequency [Joint Pain] : joint pain [Muscle Weakness] : muscle weakness [Back Pain] : back pain [Itching] : Itching [Unsteady Walking] : ataxia [Fatigue] : no fatigue [Hearing Loss] : no hearing loss [Sore Throat] : no sore throat [Chest Pain] : no chest pain [Palpitations] : no palpitations [Shortness Of Breath] : no shortness of breath [Wheezing] : no wheezing [Cough] : no cough [Abdominal Pain] : no abdominal pain [Joint Swelling] : no joint swelling [Skin Rash] : no skin rash [FreeTextEntry3] : f/u with ophthalmologist reading glasses  [FreeTextEntry8] : wakes up to pee at 3 am  [de-identified] : derm this month

## 2024-09-30 NOTE — ASSESSMENT
[FreeTextEntry1] : Back Pain and Leg Numbness Referral for PT   Mental Health  Refill prescription for Lorazepam.  HTN:  Continue current medications (Hydrochlorothiazide and Nebivolol) and maintain regular follow-ups with cardiology.   History of Chronic Thrombosis Continue lifelong anticoagulation as directed by hematology/oncology.  Influenza Vaccine: Administered

## 2024-09-30 NOTE — PHYSICAL EXAM
[No Acute Distress] : no acute distress [Well Nourished] : well nourished [Normal Sclera/Conjunctiva] : normal sclera/conjunctiva [Normal Outer Ear/Nose] : the outer ears and nose were normal in appearance [No JVD] : no jugular venous distention [No Lymphadenopathy] : no lymphadenopathy [Supple] : supple [Thyroid Normal, No Nodules] : the thyroid was normal and there were no nodules present [No Respiratory Distress] : no respiratory distress  [No Accessory Muscle Use] : no accessory muscle use [Clear to Auscultation] : lungs were clear to auscultation bilaterally [Normal Rate] : normal rate  [Regular Rhythm] : with a regular rhythm [Normal S1, S2] : normal S1 and S2 [No Murmur] : no murmur heard [No Abdominal Bruit] : a ~M bruit was not heard ~T in the abdomen [No Varicosities] : no varicosities [Pedal Pulses Present] : the pedal pulses are present [No Edema] : there was no peripheral edema [Soft] : abdomen soft [Non Tender] : non-tender [Non-distended] : non-distended [Normal Bowel Sounds] : normal bowel sounds [No Spinal Tenderness] : no spinal tenderness [No Joint Swelling] : no joint swelling [No Rash] : no rash [Coordination Grossly Intact] : coordination grossly intact [Normal Gait] : normal gait [Normal Affect] : the affect was normal [Normal Insight/Judgement] : insight and judgment were intact

## 2024-09-30 NOTE — HEALTH RISK ASSESSMENT
[0] : 2) Feeling down, depressed, or hopeless: Not at all (0) [PHQ-2 Negative - No further assessment needed] : PHQ-2 Negative - No further assessment needed [Time Spent: ___ Minutes] : I spent [unfilled] minutes performing a depression screening for this patient. [Good] : ~his/her~  mood as  good [Yes] : Yes [Monthly or less (1 pt)] : Monthly or less (1 point) [1 or 2 (0 pts)] : 1 or 2 (0 points) [No falls in past year] : Patient reported no falls in the past year [Former] : Former [< 15 Years] : < 15 Years [Fully functional (bathing, dressing, toileting, transferring, walking, feeding)] : Fully functional (bathing, dressing, toileting, transferring, walking, feeding) [Fully functional (using the telephone, shopping, preparing meals, housekeeping, doing laundry, using] : Fully functional and needs no help or supervision to perform IADLs (using the telephone, shopping, preparing meals, housekeeping, doing laundry, using transportation, managing medications and managing finances) [de-identified] : walk  [[id number]] : 0 [de-identified] : regular diet

## 2024-09-30 NOTE — END OF VISIT
[FreeTextEntry3] : I personally performed the service described in the documentation, reviewed the documentation recorded by NP student Petey Tucker in my presence and it accurately and completely records my words and actions.

## 2024-10-03 ENCOUNTER — RX RENEWAL (OUTPATIENT)
Age: 79
End: 2024-10-03

## 2024-10-04 LAB
25(OH)D3 SERPL-MCNC: 38.6 NG/ML
ALBUMIN SERPL ELPH-MCNC: 4.2 G/DL
ALP BLD-CCNC: 108 U/L
ALT SERPL-CCNC: 14 U/L
ANION GAP SERPL CALC-SCNC: 14 MMOL/L
APPEARANCE: CLEAR
AST SERPL-CCNC: 18 U/L
BACTERIA: NEGATIVE /HPF
BASOPHILS # BLD AUTO: 0.09 K/UL
BASOPHILS NFR BLD AUTO: 1.3 %
BILIRUB SERPL-MCNC: 0.6 MG/DL
BILIRUBIN URINE: NEGATIVE
BLOOD URINE: NEGATIVE
BUN SERPL-MCNC: 13 MG/DL
CALCIUM SERPL-MCNC: 9.2 MG/DL
CAST: 3 /LPF
CHLORIDE SERPL-SCNC: 100 MMOL/L
CHOLEST SERPL-MCNC: 194 MG/DL
CO2 SERPL-SCNC: 27 MMOL/L
COLOR: YELLOW
CREAT SERPL-MCNC: 0.9 MG/DL
EGFR: 64 ML/MIN/1.73M2
EOSINOPHIL # BLD AUTO: 0.19 K/UL
EOSINOPHIL NFR BLD AUTO: 2.7 %
EPITHELIAL CELLS: 12 /HPF
ESTIMATED AVERAGE GLUCOSE: 114 MG/DL
GLUCOSE QUALITATIVE U: NEGATIVE MG/DL
GLUCOSE SERPL-MCNC: 96 MG/DL
HBA1C MFR BLD HPLC: 5.6 %
HCT VFR BLD CALC: 43.5 %
HDLC SERPL-MCNC: 63 MG/DL
HGB BLD-MCNC: 13.5 G/DL
IMM GRANULOCYTES NFR BLD AUTO: 0 %
KETONES URINE: NEGATIVE MG/DL
LDLC SERPL CALC-MCNC: 99 MG/DL
LEUKOCYTE ESTERASE URINE: ABNORMAL
LYMPHOCYTES # BLD AUTO: 2.21 K/UL
LYMPHOCYTES NFR BLD AUTO: 31.7 %
MAN DIFF?: NORMAL
MCHC RBC-ENTMCNC: 27.8 PG
MCHC RBC-ENTMCNC: 31 GM/DL
MCV RBC AUTO: 89.7 FL
MICROSCOPIC-UA: NORMAL
MONOCYTES # BLD AUTO: 0.55 K/UL
MONOCYTES NFR BLD AUTO: 7.9 %
NEUTROPHILS # BLD AUTO: 3.94 K/UL
NEUTROPHILS NFR BLD AUTO: 56.4 %
NITRITE URINE: NEGATIVE
NONHDLC SERPL-MCNC: 131 MG/DL
PH URINE: 7
PLATELET # BLD AUTO: 405 K/UL
POTASSIUM SERPL-SCNC: 4.3 MMOL/L
PROT SERPL-MCNC: 7 G/DL
PROTEIN URINE: 30 MG/DL
RBC # BLD: 4.85 M/UL
RBC # FLD: 13.8 %
RED BLOOD CELLS URINE: 3 /HPF
SODIUM SERPL-SCNC: 140 MMOL/L
SPECIFIC GRAVITY URINE: 1.02
TRIGL SERPL-MCNC: 189 MG/DL
TSH SERPL-ACNC: 1.14 UIU/ML
UROBILINOGEN URINE: 1 MG/DL
WBC # FLD AUTO: 6.98 K/UL
WHITE BLOOD CELLS URINE: 13 /HPF

## 2024-10-15 ENCOUNTER — APPOINTMENT (OUTPATIENT)
Dept: DERMATOLOGY | Facility: CLINIC | Age: 79
End: 2024-10-15

## 2024-11-04 ENCOUNTER — RX RENEWAL (OUTPATIENT)
Age: 79
End: 2024-11-04

## 2024-11-11 ENCOUNTER — RX RENEWAL (OUTPATIENT)
Age: 79
End: 2024-11-11

## 2024-11-14 ENCOUNTER — RX RENEWAL (OUTPATIENT)
Age: 79
End: 2024-11-14

## 2024-11-18 ENCOUNTER — RX RENEWAL (OUTPATIENT)
Age: 79
End: 2024-11-18

## 2024-11-18 RX ORDER — ADHESIVE TAPE 3"X 2.3 YD
50 MCG TAPE, NON-MEDICATED TOPICAL
Qty: 90 | Refills: 0 | Status: ACTIVE | COMMUNITY
Start: 2024-11-18 | End: 1900-01-01

## 2024-12-02 NOTE — END OF VISIT
Anesthesia Evaluation     no history of anesthetic complications:   NPO Solid Status: > 8 hours  NPO Liquid Status: > 2 hours           Airway   Mallampati: III  Possible difficult intubation  Dental      Pulmonary    (-) not a smoker  Cardiovascular   Exercise tolerance: good (4-7 METS)    (+) hypertension, hyperlipidemia  (-) CAD      Neuro/Psych  (-) seizures  GI/Hepatic/Renal/Endo    (+) GERD  (-) liver disease, no renal disease, diabetes    Musculoskeletal     Abdominal    Substance History      OB/GYN          Other                    Anesthesia Plan    ASA 2     MAC     intravenous induction     Anesthetic plan, risks, benefits, and alternatives have been provided, discussed and informed consent has been obtained with: patient.    CODE STATUS:          [Time Spent: ___ minutes] : I have spent [unfilled] minutes of time on the encounter. [>50% of the face to face encounter time was spent on counseling and/or coordination of care for ___] : Greater than 50% of the face to face encounter time was spent on counseling and/or coordination of care for [unfilled]

## 2024-12-20 NOTE — ED ADULT TRIAGE NOTE - TEMPERATURE IN [place]HRENHEIT (DEGREES F)
Quality 226: Preventive Care And Screening: Tobacco Use: Screening And Cessation Intervention: Patient screened for tobacco use and is an ex/non-smoker
Quality 111:Pneumonia Vaccination Status For Older Adults: Pneumococcal vaccine (PPSV23) administered on or after patient’s 60th birthday and before the end of the measurement period
Detail Level: Detailed
Quality 431: Preventive Care And Screening: Unhealthy Alcohol Use - Screening: Patient not identified as an unhealthy alcohol user when screened for unhealthy alcohol use using a systematic screening method
Quality 130: Documentation Of Current Medications In The Medical Record: Current Medications Documented
Quality 110: Preventive Care And Screening: Influenza Immunization: Influenza Immunization Administered during Influenza season
97.5

## 2025-03-10 NOTE — DISCHARGE NOTE NURSING/CASE MANAGEMENT/SOCIAL WORK - HISTORY OF COVID-19 VACCINATION
[facility]    Home Care Following Endoscopy          Activity:  You have just undergone an endoscopic procedure usually performed with conscious sedation.  Do not work or operate machinery (including a car) for at least 12 hours.    I encourage you to walk and attempt to pass this air as soon as possible.    Diet:  Return to the diet you were on before your procedure but eat lightly for the first 12-24 hours.  Drink plenty of water.  Resume any regular medications unless otherwise advised by your physician.  Please begin any new medication prescribed as a result of your procedure as directed by your physician.   If you had any biopsy or polyp removed please refrain from aspirin or aspirin products for 2 days.  If on Coumadin please restart as instructed by your physician.   Pain:  You may take Tylenol as needed for pain.  Expected Recovery:  You can expect some mild abdominal fullness and/or discomfort due to the air used to inflate your intestinal tract. It is also normal to have a mild sore throat after upper endoscopy.    Call Your Physician if You Have:  After Upper Endoscopy:  Shoulder, back or chest pain.  Difficulty breathing or swallowing.  Vomiting blood.  After Colonoscopy:  Worsening persisting abdominal pain which is worse with activity.  Fevers (>101 degrees F), chills or shakes.  Passage of continued blood with bowel movements.     Any questions or concerns about your recovery, please call [phone] or after hours [phone] ([phone]) Nurse Advice Line.    Follow-up Care:  You did have polyps/biopsy tissue sample(s) removed.  The polyps/biopsy tissue sample(s) will be sent to pathology.    You should receive letter in your My Chart from Dr. Benjamin with your results within 1-2 weeks. If you do not participate in My Chart a physical letter will come in the mail in 2-3 weeks.  Please call if you have not received a notification of your results.     Yes

## 2025-06-09 ENCOUNTER — RX RENEWAL (OUTPATIENT)
Age: 80
End: 2025-06-09

## 2025-06-11 NOTE — H&P ADULT - NSHPPHYSICALEXAM_GEN_ALL_CORE
GENERAL: NAD, lying in bed comfortably  HEAD:  Atraumatic, Normocephalic  EYES: EOMI, PERRLA, conjunctiva and sclera clear  ENT: Moist mucous membranes  NECK: Supple, No JVD  CHEST/LUNG: Clear to auscultation bilaterally; No rales, rhonchi, wheezing, or rubs. Unlabored respirations  HEART: Regular rate and rhythm; No murmurs, rubs, or gallops  ABDOMEN: BSx4; Soft, nontender, nondistended  EXTREMITIES:  2+ Peripheral Pulses, brisk capillary refill. No clubbing, cyanosis, or edema  NERVOUS SYSTEM:  A&Ox3, no focal deficits   SKIN: No rashes or lesions No

## 2025-08-18 ENCOUNTER — APPOINTMENT (OUTPATIENT)
Dept: INTERNAL MEDICINE | Facility: CLINIC | Age: 80
End: 2025-08-18

## 2025-08-18 ENCOUNTER — RESULT CHARGE (OUTPATIENT)
Age: 80
End: 2025-08-18

## 2025-08-18 VITALS
TEMPERATURE: 98.4 F | HEART RATE: 81 BPM | SYSTOLIC BLOOD PRESSURE: 170 MMHG | OXYGEN SATURATION: 95 % | DIASTOLIC BLOOD PRESSURE: 68 MMHG

## 2025-08-18 DIAGNOSIS — G45.9 TRANSIENT CEREBRAL ISCHEMIC ATTACK, UNSPECIFIED: ICD-10-CM

## 2025-08-18 DIAGNOSIS — I10 ESSENTIAL (PRIMARY) HYPERTENSION: ICD-10-CM

## 2025-08-18 DIAGNOSIS — R94.31 ABNORMAL ELECTROCARDIOGRAM [ECG] [EKG]: ICD-10-CM

## 2025-08-18 PROCEDURE — 99214 OFFICE O/P EST MOD 30 MIN: CPT

## 2025-08-18 PROCEDURE — 36415 COLL VENOUS BLD VENIPUNCTURE: CPT

## 2025-08-18 PROCEDURE — 93000 ELECTROCARDIOGRAM COMPLETE: CPT

## 2025-08-18 PROCEDURE — G2211 COMPLEX E/M VISIT ADD ON: CPT

## 2025-08-19 LAB
ALBUMIN SERPL ELPH-MCNC: 4.1 G/DL
ALP BLD-CCNC: 94 U/L
ALT SERPL-CCNC: 15 U/L
ANION GAP SERPL CALC-SCNC: 16 MMOL/L
AST SERPL-CCNC: 20 U/L
BASOPHILS # BLD AUTO: 0.11 K/UL
BASOPHILS NFR BLD AUTO: 1.2 %
BILIRUB SERPL-MCNC: 0.6 MG/DL
BUN SERPL-MCNC: 16 MG/DL
CALCIUM SERPL-MCNC: 9.5 MG/DL
CHLORIDE SERPL-SCNC: 101 MMOL/L
CHOLEST SERPL-MCNC: 166 MG/DL
CO2 SERPL-SCNC: 22 MMOL/L
CREAT SERPL-MCNC: 0.8 MG/DL
EGFRCR SERPLBLD CKD-EPI 2021: 74 ML/MIN/1.73M2
EOSINOPHIL # BLD AUTO: 0.12 K/UL
EOSINOPHIL NFR BLD AUTO: 1.3 %
ESTIMATED AVERAGE GLUCOSE: 117 MG/DL
GLUCOSE SERPL-MCNC: 82 MG/DL
HBA1C MFR BLD HPLC: 5.7 %
HCT VFR BLD CALC: 40.2 %
HDLC SERPL-MCNC: 63 MG/DL
HGB BLD-MCNC: 12.8 G/DL
IMM GRANULOCYTES NFR BLD AUTO: 0.2 %
LDLC SERPL-MCNC: 80 MG/DL
LYMPHOCYTES # BLD AUTO: 2.38 K/UL
LYMPHOCYTES NFR BLD AUTO: 25.5 %
MAGNESIUM SERPL-MCNC: 1.9 MG/DL
MAN DIFF?: NORMAL
MCHC RBC-ENTMCNC: 28.4 PG
MCHC RBC-ENTMCNC: 31.8 G/DL
MCV RBC AUTO: 89.3 FL
MONOCYTES # BLD AUTO: 0.84 K/UL
MONOCYTES NFR BLD AUTO: 9 %
NEUTROPHILS # BLD AUTO: 5.87 K/UL
NEUTROPHILS NFR BLD AUTO: 62.8 %
NONHDLC SERPL-MCNC: 103 MG/DL
PLATELET # BLD AUTO: 362 K/UL
POTASSIUM SERPL-SCNC: 4.1 MMOL/L
PROT SERPL-MCNC: 6.5 G/DL
RBC # BLD: 4.5 M/UL
RBC # FLD: 13.6 %
SODIUM SERPL-SCNC: 139 MMOL/L
TRIGL SERPL-MCNC: 130 MG/DL
TSH SERPL-ACNC: 1.51 UIU/ML
WBC # FLD AUTO: 9.34 K/UL

## 2025-08-22 ENCOUNTER — INPATIENT (INPATIENT)
Facility: HOSPITAL | Age: 80
LOS: 2 days | Discharge: HOME | DRG: 69 | End: 2025-08-25
Attending: PSYCHIATRY & NEUROLOGY | Admitting: PSYCHIATRY & NEUROLOGY
Payer: MEDICARE

## 2025-08-22 VITALS
RESPIRATION RATE: 18 BRPM | HEIGHT: 62 IN | TEMPERATURE: 97 F | WEIGHT: 190.04 LBS | DIASTOLIC BLOOD PRESSURE: 68 MMHG | HEART RATE: 92 BPM | OXYGEN SATURATION: 94 % | SYSTOLIC BLOOD PRESSURE: 138 MMHG

## 2025-08-22 DIAGNOSIS — R47.01 APHASIA: ICD-10-CM

## 2025-08-22 DIAGNOSIS — Z86.711 PERSONAL HISTORY OF PULMONARY EMBOLISM: ICD-10-CM

## 2025-08-22 DIAGNOSIS — I48.0 PAROXYSMAL ATRIAL FIBRILLATION: ICD-10-CM

## 2025-08-22 DIAGNOSIS — Z86.73 PERSONAL HISTORY OF TRANSIENT ISCHEMIC ATTACK (TIA), AND CEREBRAL INFARCTION WITHOUT RESIDUAL DEFICITS: ICD-10-CM

## 2025-08-22 DIAGNOSIS — I10 ESSENTIAL (PRIMARY) HYPERTENSION: ICD-10-CM

## 2025-08-22 DIAGNOSIS — Z79.01 LONG TERM (CURRENT) USE OF ANTICOAGULANTS: ICD-10-CM

## 2025-08-22 DIAGNOSIS — E78.5 HYPERLIPIDEMIA, UNSPECIFIED: ICD-10-CM

## 2025-08-22 DIAGNOSIS — F41.9 ANXIETY DISORDER, UNSPECIFIED: ICD-10-CM

## 2025-08-22 LAB
ANION GAP SERPL CALC-SCNC: 15 MMOL/L — SIGNIFICANT CHANGE UP (ref 5–17)
APTT BLD: 30.4 SEC — SIGNIFICANT CHANGE UP (ref 26.1–36.8)
BASOPHILS # BLD AUTO: 0.07 K/UL — SIGNIFICANT CHANGE UP (ref 0–0.2)
BASOPHILS NFR BLD AUTO: 0.7 % — SIGNIFICANT CHANGE UP (ref 0–2)
BUN SERPL-MCNC: 15 MG/DL — SIGNIFICANT CHANGE UP (ref 7–23)
CALCIUM SERPL-MCNC: 9.4 MG/DL — SIGNIFICANT CHANGE UP (ref 8.4–10.5)
CHLORIDE SERPL-SCNC: 101 MMOL/L — SIGNIFICANT CHANGE UP (ref 96–108)
CO2 SERPL-SCNC: 23 MMOL/L — SIGNIFICANT CHANGE UP (ref 22–31)
CREAT SERPL-MCNC: 0.8 MG/DL — SIGNIFICANT CHANGE UP (ref 0.5–1.3)
EGFR: 74 ML/MIN/1.73M2 — SIGNIFICANT CHANGE UP
EGFR: 74 ML/MIN/1.73M2 — SIGNIFICANT CHANGE UP
EOSINOPHIL # BLD AUTO: 0.04 K/UL — SIGNIFICANT CHANGE UP (ref 0–0.5)
EOSINOPHIL NFR BLD AUTO: 0.4 % — SIGNIFICANT CHANGE UP (ref 0–6)
GLUCOSE SERPL-MCNC: 108 MG/DL — HIGH (ref 70–99)
HCT VFR BLD CALC: 44.7 % — SIGNIFICANT CHANGE UP (ref 34.5–45)
HGB BLD-MCNC: 14.4 G/DL — SIGNIFICANT CHANGE UP (ref 11.5–15.5)
IMM GRANULOCYTES # BLD AUTO: 0.04 K/UL — SIGNIFICANT CHANGE UP (ref 0–0.07)
IMM GRANULOCYTES NFR BLD AUTO: 0.4 % — SIGNIFICANT CHANGE UP (ref 0–0.9)
INR BLD: 1.11 — SIGNIFICANT CHANGE UP (ref 0.85–1.16)
LYMPHOCYTES # BLD AUTO: 2.43 K/UL — SIGNIFICANT CHANGE UP (ref 1–3.3)
LYMPHOCYTES NFR BLD AUTO: 25.4 % — SIGNIFICANT CHANGE UP (ref 13–44)
MCHC RBC-ENTMCNC: 28.6 PG — SIGNIFICANT CHANGE UP (ref 27–34)
MCHC RBC-ENTMCNC: 32.2 G/DL — SIGNIFICANT CHANGE UP (ref 32–36)
MCV RBC AUTO: 88.7 FL — SIGNIFICANT CHANGE UP (ref 80–100)
MONOCYTES # BLD AUTO: 0.55 K/UL — SIGNIFICANT CHANGE UP (ref 0–0.9)
MONOCYTES NFR BLD AUTO: 5.8 % — SIGNIFICANT CHANGE UP (ref 2–14)
NEUTROPHILS # BLD AUTO: 6.43 K/UL — SIGNIFICANT CHANGE UP (ref 1.8–7.4)
NEUTROPHILS NFR BLD AUTO: 67.3 % — SIGNIFICANT CHANGE UP (ref 43–77)
NRBC # BLD AUTO: 0 K/UL — SIGNIFICANT CHANGE UP (ref 0–0)
NRBC # FLD: 0 K/UL — SIGNIFICANT CHANGE UP (ref 0–0)
NRBC BLD AUTO-RTO: 0 /100 WBCS — SIGNIFICANT CHANGE UP (ref 0–0)
PLATELET # BLD AUTO: 417 K/UL — HIGH (ref 150–400)
PMV BLD: 10.8 FL — SIGNIFICANT CHANGE UP (ref 7–13)
POTASSIUM SERPL-MCNC: 4 MMOL/L — SIGNIFICANT CHANGE UP (ref 3.5–5.3)
POTASSIUM SERPL-SCNC: 4 MMOL/L — SIGNIFICANT CHANGE UP (ref 3.5–5.3)
PROTHROM AB SERPL-ACNC: 12.9 SEC — SIGNIFICANT CHANGE UP (ref 9.9–13.4)
RBC # BLD: 5.04 M/UL — SIGNIFICANT CHANGE UP (ref 3.8–5.2)
RBC # FLD: 13.4 % — SIGNIFICANT CHANGE UP (ref 10.3–14.5)
SODIUM SERPL-SCNC: 139 MMOL/L — SIGNIFICANT CHANGE UP (ref 135–145)
TROPONIN T, HIGH SENSITIVITY RESULT: 28 NG/L — HIGH
TROPONIN T, HIGH SENSITIVITY RESULT: 29 NG/L — HIGH
WBC # BLD: 9.56 K/UL — SIGNIFICANT CHANGE UP (ref 3.8–10.5)
WBC # FLD AUTO: 9.56 K/UL — SIGNIFICANT CHANGE UP (ref 3.8–10.5)

## 2025-08-22 PROCEDURE — 36415 COLL VENOUS BLD VENIPUNCTURE: CPT

## 2025-08-22 PROCEDURE — 82962 GLUCOSE BLOOD TEST: CPT

## 2025-08-22 PROCEDURE — 99285 EMERGENCY DEPT VISIT HI MDM: CPT

## 2025-08-22 PROCEDURE — 70498 CT ANGIOGRAPHY NECK: CPT | Mod: 26

## 2025-08-22 PROCEDURE — 85610 PROTHROMBIN TIME: CPT

## 2025-08-22 PROCEDURE — 70450 CT HEAD/BRAIN W/O DYE: CPT | Mod: 26,XU

## 2025-08-22 PROCEDURE — 70496 CT ANGIOGRAPHY HEAD: CPT | Mod: 26

## 2025-08-22 PROCEDURE — 84484 ASSAY OF TROPONIN QUANT: CPT

## 2025-08-22 PROCEDURE — 85730 THROMBOPLASTIN TIME PARTIAL: CPT

## 2025-08-22 PROCEDURE — 71045 X-RAY EXAM CHEST 1 VIEW: CPT | Mod: 26

## 2025-08-22 PROCEDURE — 80048 BASIC METABOLIC PNL TOTAL CA: CPT

## 2025-08-22 PROCEDURE — 85025 COMPLETE CBC W/AUTO DIFF WBC: CPT

## 2025-08-22 RX ORDER — METOPROLOL SUCCINATE 50 MG/1
25 TABLET, EXTENDED RELEASE ORAL ONCE
Refills: 0 | Status: COMPLETED | OUTPATIENT
Start: 2025-08-22 | End: 2025-08-22

## 2025-08-22 RX ORDER — HYDROCHLOROTHIAZIDE 50 MG/1
1 TABLET ORAL
Refills: 0 | DISCHARGE

## 2025-08-22 RX ORDER — ROSUVASTATIN CALCIUM 20 MG/1
10 TABLET, FILM COATED ORAL AT BEDTIME
Refills: 0 | Status: DISCONTINUED | OUTPATIENT
Start: 2025-08-22 | End: 2025-08-25

## 2025-08-22 RX ORDER — CALCIUM CARBONATE 750 MG/1
1 TABLET ORAL THREE TIMES A DAY
Refills: 0 | Status: DISCONTINUED | OUTPATIENT
Start: 2025-08-22 | End: 2025-08-25

## 2025-08-22 RX ORDER — METOPROLOL SUCCINATE 50 MG/1
25 TABLET, EXTENDED RELEASE ORAL EVERY 8 HOURS
Refills: 0 | Status: DISCONTINUED | OUTPATIENT
Start: 2025-08-23 | End: 2025-08-25

## 2025-08-22 RX ORDER — LORAZEPAM 4 MG/ML
0.25 VIAL (ML) INJECTION EVERY 8 HOURS
Refills: 0 | Status: DISCONTINUED | OUTPATIENT
Start: 2025-08-22 | End: 2025-08-22

## 2025-08-22 RX ORDER — METOPROLOL SUCCINATE 50 MG/1
5 TABLET, EXTENDED RELEASE ORAL ONCE
Refills: 0 | Status: COMPLETED | OUTPATIENT
Start: 2025-08-22 | End: 2025-08-22

## 2025-08-22 RX ORDER — APIXABAN 2.5 MG/1
5 TABLET, FILM COATED ORAL EVERY 12 HOURS
Refills: 0 | Status: DISCONTINUED | OUTPATIENT
Start: 2025-08-22 | End: 2025-08-25

## 2025-08-22 RX ADMIN — METOPROLOL SUCCINATE 25 MILLIGRAM(S): 50 TABLET, EXTENDED RELEASE ORAL at 21:13

## 2025-08-22 RX ADMIN — APIXABAN 5 MILLIGRAM(S): 2.5 TABLET, FILM COATED ORAL at 21:00

## 2025-08-22 RX ADMIN — METOPROLOL SUCCINATE 5 MILLIGRAM(S): 50 TABLET, EXTENDED RELEASE ORAL at 20:08

## 2025-08-22 RX ADMIN — ROSUVASTATIN CALCIUM 10 MILLIGRAM(S): 20 TABLET, FILM COATED ORAL at 21:00

## 2025-08-22 RX ADMIN — METOPROLOL SUCCINATE 25 MILLIGRAM(S): 50 TABLET, EXTENDED RELEASE ORAL at 16:06

## 2025-08-23 LAB
A1C WITH ESTIMATED AVERAGE GLUCOSE RESULT: 5.6 % — SIGNIFICANT CHANGE UP (ref 4–5.6)
ANION GAP SERPL CALC-SCNC: 12 MMOL/L — SIGNIFICANT CHANGE UP (ref 5–17)
BASOPHILS # BLD AUTO: 0.08 K/UL — SIGNIFICANT CHANGE UP (ref 0–0.2)
BASOPHILS NFR BLD AUTO: 0.9 % — SIGNIFICANT CHANGE UP (ref 0–2)
BUN SERPL-MCNC: 18 MG/DL — SIGNIFICANT CHANGE UP (ref 7–23)
CALCIUM SERPL-MCNC: 8.8 MG/DL — SIGNIFICANT CHANGE UP (ref 8.4–10.5)
CHLORIDE SERPL-SCNC: 104 MMOL/L — SIGNIFICANT CHANGE UP (ref 96–108)
CHOLEST SERPL-MCNC: 131 MG/DL — SIGNIFICANT CHANGE UP
CO2 SERPL-SCNC: 25 MMOL/L — SIGNIFICANT CHANGE UP (ref 22–31)
CREAT SERPL-MCNC: 0.9 MG/DL — SIGNIFICANT CHANGE UP (ref 0.5–1.3)
EGFR: 64 ML/MIN/1.73M2 — SIGNIFICANT CHANGE UP
EGFR: 64 ML/MIN/1.73M2 — SIGNIFICANT CHANGE UP
EOSINOPHIL # BLD AUTO: 0.14 K/UL — SIGNIFICANT CHANGE UP (ref 0–0.5)
EOSINOPHIL NFR BLD AUTO: 1.7 % — SIGNIFICANT CHANGE UP (ref 0–6)
ESTIMATED AVERAGE GLUCOSE: 114 MG/DL — SIGNIFICANT CHANGE UP (ref 68–114)
GLUCOSE SERPL-MCNC: 110 MG/DL — HIGH (ref 70–99)
HCT VFR BLD CALC: 41 % — SIGNIFICANT CHANGE UP (ref 34.5–45)
HDLC SERPL-MCNC: 49 MG/DL — LOW
HGB BLD-MCNC: 13.1 G/DL — SIGNIFICANT CHANGE UP (ref 11.5–15.5)
IMM GRANULOCYTES # BLD AUTO: 0.02 K/UL — SIGNIFICANT CHANGE UP (ref 0–0.07)
IMM GRANULOCYTES NFR BLD AUTO: 0.2 % — SIGNIFICANT CHANGE UP (ref 0–0.9)
LDLC SERPL-MCNC: 53 MG/DL — SIGNIFICANT CHANGE UP
LIPID PNL WITH DIRECT LDL SERPL: 53 MG/DL — SIGNIFICANT CHANGE UP
LYMPHOCYTES # BLD AUTO: 2.95 K/UL — SIGNIFICANT CHANGE UP (ref 1–3.3)
LYMPHOCYTES NFR BLD AUTO: 34.8 % — SIGNIFICANT CHANGE UP (ref 13–44)
MAGNESIUM SERPL-MCNC: 1.8 MG/DL — SIGNIFICANT CHANGE UP (ref 1.6–2.6)
MCHC RBC-ENTMCNC: 28.7 PG — SIGNIFICANT CHANGE UP (ref 27–34)
MCHC RBC-ENTMCNC: 32 G/DL — SIGNIFICANT CHANGE UP (ref 32–36)
MCV RBC AUTO: 89.7 FL — SIGNIFICANT CHANGE UP (ref 80–100)
MONOCYTES # BLD AUTO: 0.88 K/UL — SIGNIFICANT CHANGE UP (ref 0–0.9)
MONOCYTES NFR BLD AUTO: 10.4 % — SIGNIFICANT CHANGE UP (ref 2–14)
NEUTROPHILS # BLD AUTO: 4.4 K/UL — SIGNIFICANT CHANGE UP (ref 1.8–7.4)
NEUTROPHILS NFR BLD AUTO: 52 % — SIGNIFICANT CHANGE UP (ref 43–77)
NONHDLC SERPL-MCNC: 82 MG/DL — SIGNIFICANT CHANGE UP
NRBC # BLD AUTO: 0 K/UL — SIGNIFICANT CHANGE UP (ref 0–0)
NRBC # FLD: 0 K/UL — SIGNIFICANT CHANGE UP (ref 0–0)
NRBC BLD AUTO-RTO: 0 /100 WBCS — SIGNIFICANT CHANGE UP (ref 0–0)
PHOSPHATE SERPL-MCNC: 3.8 MG/DL — SIGNIFICANT CHANGE UP (ref 2.5–4.5)
PLATELET # BLD AUTO: 379 K/UL — SIGNIFICANT CHANGE UP (ref 150–400)
PMV BLD: 10.7 FL — SIGNIFICANT CHANGE UP (ref 7–13)
POTASSIUM SERPL-MCNC: 3.7 MMOL/L — SIGNIFICANT CHANGE UP (ref 3.5–5.3)
POTASSIUM SERPL-SCNC: 3.7 MMOL/L — SIGNIFICANT CHANGE UP (ref 3.5–5.3)
RBC # BLD: 4.57 M/UL — SIGNIFICANT CHANGE UP (ref 3.8–5.2)
RBC # FLD: 13.3 % — SIGNIFICANT CHANGE UP (ref 10.3–14.5)
SODIUM SERPL-SCNC: 141 MMOL/L — SIGNIFICANT CHANGE UP (ref 135–145)
TRIGL SERPL-MCNC: 179 MG/DL — HIGH
TSH SERPL-MCNC: 1.87 UIU/ML — SIGNIFICANT CHANGE UP (ref 0.27–4.2)
WBC # BLD: 8.47 K/UL — SIGNIFICANT CHANGE UP (ref 3.8–10.5)
WBC # FLD AUTO: 8.47 K/UL — SIGNIFICANT CHANGE UP (ref 3.8–10.5)

## 2025-08-23 PROCEDURE — 93010 ELECTROCARDIOGRAM REPORT: CPT

## 2025-08-23 PROCEDURE — 99222 1ST HOSP IP/OBS MODERATE 55: CPT

## 2025-08-23 PROCEDURE — 70551 MRI BRAIN STEM W/O DYE: CPT | Mod: 26

## 2025-08-23 PROCEDURE — 99223 1ST HOSP IP/OBS HIGH 75: CPT

## 2025-08-23 RX ORDER — MELATONIN 5 MG
5 TABLET ORAL ONCE
Refills: 0 | Status: COMPLETED | OUTPATIENT
Start: 2025-08-23 | End: 2025-08-23

## 2025-08-23 RX ORDER — LIDOCAINE HYDROCHLORIDE 20 MG/ML
1 JELLY TOPICAL ONCE
Refills: 0 | Status: COMPLETED | OUTPATIENT
Start: 2025-08-23 | End: 2025-08-23

## 2025-08-23 RX ORDER — LORAZEPAM 4 MG/ML
0.5 VIAL (ML) INJECTION ONCE
Refills: 0 | Status: DISCONTINUED | OUTPATIENT
Start: 2025-08-23 | End: 2025-08-23

## 2025-08-23 RX ADMIN — LIDOCAINE HYDROCHLORIDE 1 PATCH: 20 JELLY TOPICAL at 21:00

## 2025-08-23 RX ADMIN — ROSUVASTATIN CALCIUM 10 MILLIGRAM(S): 20 TABLET, FILM COATED ORAL at 21:28

## 2025-08-23 RX ADMIN — LIDOCAINE HYDROCHLORIDE 1 PATCH: 20 JELLY TOPICAL at 10:08

## 2025-08-23 RX ADMIN — Medication 5 MILLIGRAM(S): at 01:26

## 2025-08-23 RX ADMIN — Medication 0.5 MILLIGRAM(S): at 15:57

## 2025-08-23 RX ADMIN — APIXABAN 5 MILLIGRAM(S): 2.5 TABLET, FILM COATED ORAL at 21:28

## 2025-08-23 RX ADMIN — Medication 40 MILLIEQUIVALENT(S): at 09:11

## 2025-08-23 RX ADMIN — METOPROLOL SUCCINATE 25 MILLIGRAM(S): 50 TABLET, EXTENDED RELEASE ORAL at 05:14

## 2025-08-23 RX ADMIN — LIDOCAINE HYDROCHLORIDE 1 PATCH: 20 JELLY TOPICAL at 22:30

## 2025-08-23 RX ADMIN — APIXABAN 5 MILLIGRAM(S): 2.5 TABLET, FILM COATED ORAL at 09:11

## 2025-08-24 LAB
ANION GAP SERPL CALC-SCNC: 11 MMOL/L — SIGNIFICANT CHANGE UP (ref 5–17)
BASOPHILS # BLD AUTO: 0.09 K/UL — SIGNIFICANT CHANGE UP (ref 0–0.2)
BASOPHILS NFR BLD AUTO: 1.2 % — SIGNIFICANT CHANGE UP (ref 0–2)
BUN SERPL-MCNC: 23 MG/DL — SIGNIFICANT CHANGE UP (ref 7–23)
CALCIUM SERPL-MCNC: 9 MG/DL — SIGNIFICANT CHANGE UP (ref 8.4–10.5)
CHLORIDE SERPL-SCNC: 106 MMOL/L — SIGNIFICANT CHANGE UP (ref 96–108)
CO2 SERPL-SCNC: 23 MMOL/L — SIGNIFICANT CHANGE UP (ref 22–31)
CREAT SERPL-MCNC: 0.88 MG/DL — SIGNIFICANT CHANGE UP (ref 0.5–1.3)
EGFR: 66 ML/MIN/1.73M2 — SIGNIFICANT CHANGE UP
EGFR: 66 ML/MIN/1.73M2 — SIGNIFICANT CHANGE UP
EOSINOPHIL # BLD AUTO: 0.28 K/UL — SIGNIFICANT CHANGE UP (ref 0–0.5)
EOSINOPHIL NFR BLD AUTO: 3.7 % — SIGNIFICANT CHANGE UP (ref 0–6)
GLUCOSE SERPL-MCNC: 102 MG/DL — HIGH (ref 70–99)
HCT VFR BLD CALC: 38.9 % — SIGNIFICANT CHANGE UP (ref 34.5–45)
HGB BLD-MCNC: 12.3 G/DL — SIGNIFICANT CHANGE UP (ref 11.5–15.5)
IMM GRANULOCYTES # BLD AUTO: 0.01 K/UL — SIGNIFICANT CHANGE UP (ref 0–0.07)
IMM GRANULOCYTES NFR BLD AUTO: 0.1 % — SIGNIFICANT CHANGE UP (ref 0–0.9)
LYMPHOCYTES # BLD AUTO: 3.6 K/UL — HIGH (ref 1–3.3)
LYMPHOCYTES NFR BLD AUTO: 47.4 % — HIGH (ref 13–44)
MAGNESIUM SERPL-MCNC: 1.9 MG/DL — SIGNIFICANT CHANGE UP (ref 1.6–2.6)
MCHC RBC-ENTMCNC: 28.7 PG — SIGNIFICANT CHANGE UP (ref 27–34)
MCHC RBC-ENTMCNC: 31.6 G/DL — LOW (ref 32–36)
MCV RBC AUTO: 90.9 FL — SIGNIFICANT CHANGE UP (ref 80–100)
MONOCYTES # BLD AUTO: 0.71 K/UL — SIGNIFICANT CHANGE UP (ref 0–0.9)
MONOCYTES NFR BLD AUTO: 9.3 % — SIGNIFICANT CHANGE UP (ref 2–14)
NEUTROPHILS # BLD AUTO: 2.91 K/UL — SIGNIFICANT CHANGE UP (ref 1.8–7.4)
NEUTROPHILS NFR BLD AUTO: 38.3 % — LOW (ref 43–77)
NRBC # BLD AUTO: 0 K/UL — SIGNIFICANT CHANGE UP (ref 0–0)
NRBC # FLD: 0 K/UL — SIGNIFICANT CHANGE UP (ref 0–0)
NRBC BLD AUTO-RTO: 0 /100 WBCS — SIGNIFICANT CHANGE UP (ref 0–0)
PHOSPHATE SERPL-MCNC: 4 MG/DL — SIGNIFICANT CHANGE UP (ref 2.5–4.5)
PLATELET # BLD AUTO: 373 K/UL — SIGNIFICANT CHANGE UP (ref 150–400)
PMV BLD: 10.8 FL — SIGNIFICANT CHANGE UP (ref 7–13)
POTASSIUM SERPL-MCNC: 4.3 MMOL/L — SIGNIFICANT CHANGE UP (ref 3.5–5.3)
POTASSIUM SERPL-SCNC: 4.3 MMOL/L — SIGNIFICANT CHANGE UP (ref 3.5–5.3)
RBC # BLD: 4.28 M/UL — SIGNIFICANT CHANGE UP (ref 3.8–5.2)
RBC # FLD: 13.7 % — SIGNIFICANT CHANGE UP (ref 10.3–14.5)
SODIUM SERPL-SCNC: 140 MMOL/L — SIGNIFICANT CHANGE UP (ref 135–145)
WBC # BLD: 7.6 K/UL — SIGNIFICANT CHANGE UP (ref 3.8–10.5)
WBC # FLD AUTO: 7.6 K/UL — SIGNIFICANT CHANGE UP (ref 3.8–10.5)

## 2025-08-24 PROCEDURE — 99232 SBSQ HOSP IP/OBS MODERATE 35: CPT

## 2025-08-24 RX ORDER — LIDOCAINE HYDROCHLORIDE 20 MG/ML
1 JELLY TOPICAL ONCE
Refills: 0 | Status: COMPLETED | OUTPATIENT
Start: 2025-08-24 | End: 2025-08-24

## 2025-08-24 RX ORDER — MELATONIN 5 MG
5 TABLET ORAL ONCE
Refills: 0 | Status: COMPLETED | OUTPATIENT
Start: 2025-08-24 | End: 2025-08-24

## 2025-08-24 RX ADMIN — LIDOCAINE HYDROCHLORIDE 1 PATCH: 20 JELLY TOPICAL at 18:47

## 2025-08-24 RX ADMIN — APIXABAN 5 MILLIGRAM(S): 2.5 TABLET, FILM COATED ORAL at 09:39

## 2025-08-24 RX ADMIN — METOPROLOL SUCCINATE 25 MILLIGRAM(S): 50 TABLET, EXTENDED RELEASE ORAL at 13:26

## 2025-08-24 RX ADMIN — METOPROLOL SUCCINATE 25 MILLIGRAM(S): 50 TABLET, EXTENDED RELEASE ORAL at 21:13

## 2025-08-24 RX ADMIN — LIDOCAINE HYDROCHLORIDE 1 PATCH: 20 JELLY TOPICAL at 14:11

## 2025-08-24 RX ADMIN — Medication 5 MILLIGRAM(S): at 21:24

## 2025-08-24 RX ADMIN — APIXABAN 5 MILLIGRAM(S): 2.5 TABLET, FILM COATED ORAL at 21:07

## 2025-08-24 RX ADMIN — METOPROLOL SUCCINATE 25 MILLIGRAM(S): 50 TABLET, EXTENDED RELEASE ORAL at 05:33

## 2025-08-24 RX ADMIN — ROSUVASTATIN CALCIUM 10 MILLIGRAM(S): 20 TABLET, FILM COATED ORAL at 21:07

## 2025-08-25 ENCOUNTER — RESULT REVIEW (OUTPATIENT)
Age: 80
End: 2025-08-25

## 2025-08-25 ENCOUNTER — TRANSCRIPTION ENCOUNTER (OUTPATIENT)
Age: 80
End: 2025-08-25

## 2025-08-25 VITALS
SYSTOLIC BLOOD PRESSURE: 154 MMHG | HEART RATE: 72 BPM | RESPIRATION RATE: 20 BRPM | OXYGEN SATURATION: 95 % | DIASTOLIC BLOOD PRESSURE: 76 MMHG

## 2025-08-25 DIAGNOSIS — I10 ESSENTIAL (PRIMARY) HYPERTENSION: ICD-10-CM

## 2025-08-25 DIAGNOSIS — I48.91 UNSPECIFIED ATRIAL FIBRILLATION: ICD-10-CM

## 2025-08-25 DIAGNOSIS — Z86.718 PERSONAL HISTORY OF OTHER VENOUS THROMBOSIS AND EMBOLISM: ICD-10-CM

## 2025-08-25 DIAGNOSIS — F41.9 ANXIETY DISORDER, UNSPECIFIED: ICD-10-CM

## 2025-08-25 DIAGNOSIS — R47.89 OTHER SPEECH DISTURBANCES: ICD-10-CM

## 2025-08-25 PROCEDURE — 99285 EMERGENCY DEPT VISIT HI MDM: CPT

## 2025-08-25 PROCEDURE — 84484 ASSAY OF TROPONIN QUANT: CPT

## 2025-08-25 PROCEDURE — 82962 GLUCOSE BLOOD TEST: CPT

## 2025-08-25 PROCEDURE — 70551 MRI BRAIN STEM W/O DYE: CPT

## 2025-08-25 PROCEDURE — 97166 OT EVAL MOD COMPLEX 45 MIN: CPT

## 2025-08-25 PROCEDURE — 97530 THERAPEUTIC ACTIVITIES: CPT

## 2025-08-25 PROCEDURE — 99233 SBSQ HOSP IP/OBS HIGH 50: CPT

## 2025-08-25 PROCEDURE — 70498 CT ANGIOGRAPHY NECK: CPT

## 2025-08-25 PROCEDURE — 84443 ASSAY THYROID STIM HORMONE: CPT

## 2025-08-25 PROCEDURE — 71045 X-RAY EXAM CHEST 1 VIEW: CPT

## 2025-08-25 PROCEDURE — 70496 CT ANGIOGRAPHY HEAD: CPT

## 2025-08-25 PROCEDURE — 97161 PT EVAL LOW COMPLEX 20 MIN: CPT

## 2025-08-25 PROCEDURE — 85730 THROMBOPLASTIN TIME PARTIAL: CPT

## 2025-08-25 PROCEDURE — 85610 PROTHROMBIN TIME: CPT

## 2025-08-25 PROCEDURE — 85025 COMPLETE CBC W/AUTO DIFF WBC: CPT

## 2025-08-25 PROCEDURE — 93005 ELECTROCARDIOGRAM TRACING: CPT

## 2025-08-25 PROCEDURE — 36415 COLL VENOUS BLD VENIPUNCTURE: CPT

## 2025-08-25 PROCEDURE — 83036 HEMOGLOBIN GLYCOSYLATED A1C: CPT

## 2025-08-25 PROCEDURE — 83735 ASSAY OF MAGNESIUM: CPT

## 2025-08-25 PROCEDURE — 80048 BASIC METABOLIC PNL TOTAL CA: CPT

## 2025-08-25 PROCEDURE — 93306 TTE W/DOPPLER COMPLETE: CPT

## 2025-08-25 PROCEDURE — 93306 TTE W/DOPPLER COMPLETE: CPT | Mod: 26

## 2025-08-25 PROCEDURE — 80061 LIPID PANEL: CPT

## 2025-08-25 PROCEDURE — 84100 ASSAY OF PHOSPHORUS: CPT

## 2025-08-25 PROCEDURE — 70450 CT HEAD/BRAIN W/O DYE: CPT

## 2025-08-25 RX ORDER — APIXABAN 2.5 MG/1
1 TABLET, FILM COATED ORAL
Qty: 60 | Refills: 2
Start: 2025-08-25 | End: 2025-11-22

## 2025-08-25 RX ORDER — APIXABAN 2.5 MG/1
1 TABLET, FILM COATED ORAL
Refills: 0 | DISCHARGE

## 2025-08-25 RX ORDER — METOPROLOL SUCCINATE 50 MG/1
1 TABLET, EXTENDED RELEASE ORAL
Qty: 30 | Refills: 2
Start: 2025-08-25 | End: 2025-11-22

## 2025-08-25 RX ORDER — APIXABAN 2.5 MG/1
1 TABLET, FILM COATED ORAL
Qty: 0 | Refills: 0 | DISCHARGE
Start: 2025-08-25

## 2025-08-25 RX ADMIN — LIDOCAINE HYDROCHLORIDE 1 PATCH: 20 JELLY TOPICAL at 01:17

## 2025-08-25 RX ADMIN — APIXABAN 5 MILLIGRAM(S): 2.5 TABLET, FILM COATED ORAL at 08:21

## 2025-08-25 RX ADMIN — METOPROLOL SUCCINATE 25 MILLIGRAM(S): 50 TABLET, EXTENDED RELEASE ORAL at 15:12

## 2025-08-25 RX ADMIN — METOPROLOL SUCCINATE 25 MILLIGRAM(S): 50 TABLET, EXTENDED RELEASE ORAL at 05:59

## 2025-08-26 PROBLEM — G45.9 TIA ON MEDICATION: Status: ACTIVE | Noted: 2025-08-18

## 2025-08-27 ENCOUNTER — APPOINTMENT (OUTPATIENT)
Dept: HEART AND VASCULAR | Facility: CLINIC | Age: 80
End: 2025-08-27

## 2025-08-27 ENCOUNTER — APPOINTMENT (OUTPATIENT)
Dept: HEART AND VASCULAR | Facility: CLINIC | Age: 80
End: 2025-08-27
Payer: MEDICARE

## 2025-08-27 DIAGNOSIS — I48.91 UNSPECIFIED ATRIAL FIBRILLATION: ICD-10-CM

## 2025-08-27 PROCEDURE — 99213 OFFICE O/P EST LOW 20 MIN: CPT | Mod: 93

## 2025-08-27 RX ORDER — METOPROLOL SUCCINATE 50 MG/1
50 TABLET, EXTENDED RELEASE ORAL
Refills: 0 | Status: ACTIVE | COMMUNITY
Start: 2025-08-27

## 2025-08-28 PROBLEM — I48.91 ATRIAL FIBRILLATION, UNSPECIFIED TYPE: Status: ACTIVE | Noted: 2025-08-28
